# Patient Record
Sex: FEMALE | Race: WHITE | Employment: OTHER | URBAN - METROPOLITAN AREA
[De-identification: names, ages, dates, MRNs, and addresses within clinical notes are randomized per-mention and may not be internally consistent; named-entity substitution may affect disease eponyms.]

---

## 2017-01-19 NOTE — PATIENT DISCUSSION
(G32.812) Other secondary cataract, bilateral - Assesment : Posterior capsule opacification present. OS>OD. Pt is bothered and symptomatic OS. - Plan : Recommended Yag Cap OS to improve visual symptoms OS. R/B/A's discussed, including risk of RD. All questions answered. Pt wishes to proceed. Schedule Yag Cap OS.

## 2017-01-19 NOTE — PATIENT DISCUSSION
(Q48.9860) Nexdtve age-related mclr degn bilateral intermed dry stage - Assesment : Examination revealed AMD Dry. Mac OCT today. - Plan : Monitor for changes. Continue AREDS supplements and monitoring Amsler Grid at home. Call with any vision or Amsler changes. RTC in 6 months for IOP Check and OCT Mac, sooner if problems or changes noticed.

## 2017-01-30 ENCOUNTER — HOSPITAL ENCOUNTER (OUTPATIENT)
Dept: MAMMOGRAPHY | Age: 62
Discharge: HOME | End: 2017-01-30
Attending: INTERNAL MEDICINE
Payer: COMMERCIAL

## 2017-01-30 DIAGNOSIS — Z12.31 VISIT FOR SCREENING MAMMOGRAM: ICD-10-CM

## 2017-01-30 PROCEDURE — 77063 BREAST TOMOSYNTHESIS BI: CPT

## 2017-03-20 NOTE — PATIENT DISCUSSION
(H35.342) Macular cyst, hole, or pseudohole, left eye - Assesment : Examination revealed a macular cyst - enlarging on OCT.  - Plan : Refer to retina specialist.

## 2017-03-20 NOTE — PATIENT DISCUSSION
(F10.692) Other secondary cataract, left eye - Assesment : s/p Yag OS. Doing well. - Plan : Monitor for Changes. RTC in July as scheduled.

## 2017-06-12 ENCOUNTER — OFFICE VISIT (OUTPATIENT)
Dept: VASCULAR SURGERY | Age: 62
End: 2017-06-12

## 2017-06-12 DIAGNOSIS — M79.604 PAIN OF RIGHT LOWER EXTREMITY: Primary | ICD-10-CM

## 2017-06-12 NOTE — PROCEDURES
[facility]   *** FINAL REPORT ***    Name: Salomon Dance  MRN: [id number]       Outpatient  : 29 Dec 1955  HIS Order #: [id number]  [address] [facility] Visit #: [id number]  Date: 2017    TYPE OF TEST: Peripheral Venous Testing    REASON FOR TEST  Pain in limb    Right Leg:-  Deep venous thrombosis:           No  Superficial venous thrombosis:    Not examined  Deep venous insufficiency:        Not examined  Superficial venous insufficiency: Not examined      INTERPRETATION/FINDINGS  Duplex images were obtained using 2-D gray scale, color flow and  spectral doppler analysis. Right leg :  1. No evidence of deep venous thrombosis detected in the common  femoral, femoral, profunda, popliteal, posterior tibial and peroneal  veins visualized. 2. No evidence of superficial thrombosis in the area of pain in the  calf. 3. Hyperechoic area visualized in the medial calf with palpable  tenderness. 4. Patent contralateral common femoral vein without evidence of  thrombus. 5. Multphasic tibial doppler signals at rest on the right. ADDITIONAL COMMENTS  Bianca Rivera given verbal report for Dr. Nick Damon @ 1:22pm.    I have personally reviewed the data relevant to the interpretation of  this  study. TECHNOLOGIST: Naren Gaspar RDMS  Signed: 2017 01:22 PM    PHYSICIAN: Nathaniel Escudero.  Francy Gaming MD  Signed: 2017 02:27 PM

## 2017-07-10 ENCOUNTER — OFFICE VISIT (OUTPATIENT)
Dept: CARDIOLOGY CLINIC | Age: 62
End: 2017-07-10

## 2017-07-10 VITALS
SYSTOLIC BLOOD PRESSURE: 134 MMHG | HEIGHT: 66 IN | HEART RATE: 79 BPM | WEIGHT: 171 LBS | BODY MASS INDEX: 27.48 KG/M2 | DIASTOLIC BLOOD PRESSURE: 80 MMHG

## 2017-07-10 DIAGNOSIS — R06.02 SOB (SHORTNESS OF BREATH) ON EXERTION: ICD-10-CM

## 2017-07-10 DIAGNOSIS — I10 ESSENTIAL HYPERTENSION: Primary | ICD-10-CM

## 2017-07-10 DIAGNOSIS — R07.89 CHEST PRESSURE: ICD-10-CM

## 2017-07-10 DIAGNOSIS — R42 DIZZINESS: ICD-10-CM

## 2017-07-10 RX ORDER — RANITIDINE 150 MG/1
150 TABLET, FILM COATED ORAL 2 TIMES DAILY
COMMUNITY
End: 2018-02-21

## 2017-07-10 RX ORDER — CALCIUM POLYCARBOPHIL 625 MG
625 TABLET ORAL DAILY
COMMUNITY
End: 2019-03-08

## 2017-07-10 RX ORDER — CALCIUM CARBONATE 600 MG
600 TABLET ORAL 2 TIMES DAILY
COMMUNITY
End: 2020-03-06

## 2017-07-10 RX ORDER — HYDROCHLOROTHIAZIDE 12.5 MG/1
12.5 TABLET ORAL DAILY
COMMUNITY
End: 2017-07-14

## 2017-07-10 RX ORDER — FLUTICASONE PROPIONATE 50 MCG
2 SPRAY, SUSPENSION (ML) NASAL DAILY
COMMUNITY
End: 2018-02-21

## 2017-07-10 RX ORDER — RIZATRIPTAN BENZOATE 5 MG/1
5 TABLET ORAL
COMMUNITY

## 2017-07-10 RX ORDER — ESTRADIOL 10 UG/1
10 INSERT VAGINAL AS NEEDED
COMMUNITY
End: 2021-04-15

## 2017-07-10 NOTE — PROGRESS NOTES
HISTORY OF PRESENT ILLNESS  Eliazar Tabor is a 64 y.o. female. HPI Comments: Patient is here for follow up of diagnostic tests. Results will be discussed. Chest Pain (Angina)    The history is provided by the patient. This is a new problem. The current episode started more than 1 week ago. The problem has not changed since onset. The problem occurs every several days. The pain is associated with exertion. The pain is present in the substernal region. The pain is mild. The quality of the pain is described as tightness. The pain does not radiate. Dizziness   The history is provided by the patient. This is a new problem. The problem occurs every several days. The problem has not changed since onset. The symptoms are aggravated by exertion. Nothing relieves the symptoms. Shortness of Breath   The history is provided by the patient. This is a recurrent problem. The problem occurs intermittently. The problem has not changed since onset. Pertinent negatives include no wheezing, no rash and no leg swelling. The problem's precipitants include exercise. Review of Systems   Constitutional: Negative for chills. HENT: Negative for nosebleeds. Eyes: Negative for blurred vision and double vision. Respiratory: Negative for wheezing. Cardiovascular: Negative for leg swelling. Gastrointestinal: Negative for heartburn. Musculoskeletal: Negative for myalgias. Skin: Negative for rash. Endo/Heme/Allergies: Does not bruise/bleed easily.      Family History   Problem Relation Age of Onset    Heart Disease Father      a fib ablation/cva    Heart Attack Father     Stroke Father     Cancer Maternal Grandfather 61     colon       Past Medical History:   Diagnosis Date    Essential hypertension     Valvular heart disease     mvp       Past Surgical History:   Procedure Laterality Date    HX CATARACT REMOVAL      HX HYSTERECTOMY  2004    HX OOPHORECTOMY  2004    HX SHOULDER ARTHROSCOPY         Allergies Allergen Reactions    Epinephrine Shortness of Breath       Current Outpatient Prescriptions   Medication Sig    hydroCHLOROthiazide (HYDRODIURIL) 12.5 mg tablet Take 12.5 mg by mouth daily.  calcium carbonate (CALTREX) 600 mg calcium (1,500 mg) tablet Take 600 mg by mouth two (2) times a day.  fluticasone (FLONASE) 50 mcg/actuation nasal spray 2 Sprays by Both Nostrils route daily.  rizatriptan (MAXALT) 5 mg tablet Take 5 mg by mouth once as needed for Migraine. May repeat in 2 hours if needed    raNITIdine (ZANTAC) 150 mg tablet Take 150 mg by mouth two (2) times a day.  calcium polycarbophil (FIBERCON) 625 mg tablet Take 625 mg by mouth daily.  estradiol (VAGIFEM) 10 mcg tab vaginal tablet Insert 10 mcg into vagina daily.  aspirin 81 mg tablet Take 81 mg by mouth daily.  multivitamin (ONE A DAY) tablet Take 1 Tab by mouth daily.  magnesium 250 mg Tab Take  by mouth daily.  calcium 100 mg cap Take  by mouth daily.  omeprazole (PRILOSEC) 40 mg capsule Take 40 mg by mouth daily.  fexofenadine (ALLEGRA) 180 mg tablet Take  by mouth daily.  synthetic conj estrogens a 0.3 mg tablet Take 0.3 mg by mouth daily. No current facility-administered medications for this visit. Visit Vitals    /80    Pulse 79    Ht 5' 6\" (1.676 m)    Wt 77.6 kg (171 lb)    BMI 27.6 kg/m2           Physical Exam   Constitutional: She is oriented to person, place, and time. She appears well-developed and well-nourished. HENT:   Head: Normocephalic and atraumatic. Eyes: Conjunctivae are normal.   Neck: Neck supple. No JVD present. No tracheal deviation present. No thyromegaly present. Cardiovascular: Normal rate and regular rhythm. PMI is not displaced. Exam reveals no gallop, no S3 and no decreased pulses. No murmur heard. Pulmonary/Chest: No respiratory distress. She has no wheezes. She has no rales. She exhibits no tenderness. Abdominal: Soft. There is no tenderness. Musculoskeletal: She exhibits no edema. Neurological: She is alert and oriented to person, place, and time. Skin: Skin is warm. Psychiatric: She has a normal mood and affect. CARDIOLOGY STUDIES 7/10/2013   Echocardiogram - Complete Result normal ef,no doppler abnormality   Some recent data might be hidden         Assessment       ICD-10-CM ICD-9-CM    1. Essential hypertension I10 401.9     uses prn hctz   2. Dizziness R42 780.4 TILT TABLE EVAL W/WO DRUG    episodic  ? etiology   occasional low bp in 90's   3. Chest pressure R07.89 786.59 2D ECHO COMPLETE ADULT (TTE)      AMB POC STRESS ECHOCARDIOGRAM      ECHO TTE STRESS EXERCISE TREADMILL COMP      ECHO TTE STRESS EXRCSE COMP W OR WO CONTR      TILT TABLE EVAL W/WO DRUG    ? angina  recent ekg reposted normal at pcp   4. SOB (shortness of breath) on exertion R06.02 786.05 2D ECHO COMPLETE ADULT (TTE)      AMB POC STRESS ECHOCARDIOGRAM      ECHO TTE STRESS EXERCISE TREADMILL COMP      ECHO TTE STRESS EXRCSE COMP W OR WO CONTR      TILT TABLE EVAL W/WO DRUG    recently worse  ? angina,hcvd,less likely pulm etiology       There are no discontinued medications.     Orders Placed This Encounter    2D ECHO COMPLETE ADULT (TTE)     Standing Status:   Future     Standing Expiration Date:   1/6/2018     Order Specific Question:   Reason for Exam:     Answer:   see diagnosis    AMB POC STRESS ECHOCARDIOGRAM     Standing Status:   Future     Standing Expiration Date:   1/7/2018     Order Specific Question:   Reason for Exam:     Answer:   See diagnosis    TILT TABLE EVAL W/WO DRUG     Standing Status:   Future     Standing Expiration Date:   1/10/2018     Order Specific Question:   Reason for Exam:     Answer:   dizziness    ECHO TTE STRESS EXERCISE TREADMILL COMP     Standing Status:   Future     Standing Expiration Date:   1/10/2018     Order Specific Question:   Reason for Exam:     Answer:   htn    ECHO TTE STRESS EXRCSE COMP W OR WO CONTR Standing Status:   Future     Standing Expiration Date:   1/10/2018     Order Specific Question:   Reason for Exam:     Answer:   htn     Order Specific Question:   Contrast Enhancement (Bubble Study, Definity, Optison) may be used if criteria listed in established evidence-based protocol has been identified. Answer:   Yes       Follow-up Disposition:  Return for F/u after tests.

## 2017-07-10 NOTE — MR AVS SNAPSHOT
Visit Information Date & Time Provider Department Dept. Phone Encounter #  
 7/10/2017 12:45 PM Mindy High MD [facility] [address] [place] [address] [id number] Follow-up Instructions Return for F/u after tests. Your Appointments 7/28/2017  8:30 AM  
PROCEDURE with [facility] E[facility]) Appt Note: echo [place] [email or web address], [address] [place] [address]  
[address], [address] [place] 8/16/2017  1:15 PM  
Office Visit with Mindy High MD  
[facility]) Appt Note: post echo stress echo and tilt [email or web address], [address] [place] [address]  
[address], [address] [place] Upcoming Health Maintenance Date Due Hepatitis C Screening 1955 DTaP/Tdap/Td series (1 - Tdap) 12/29/1976 PAP AKA CERVICAL CYTOLOGY 12/29/1976 FOBT Q 1 YEAR AGE 50-75 12/29/2005 ZOSTER VACCINE AGE 60> 12/29/2015 INFLUENZA AGE 9 TO ADULT 8/1/2017 BREAST CANCER SCRN MAMMOGRAM 1/30/2019 Allergies as of 7/10/2017  Review Complete On: 7/10/2017 By: Mindy High MD  
  
 Severity Noted Reaction Type Reactions Epinephrine Medium 06/07/2013   Not Verified Shortness of Breath Current Immunizations  Never Reviewed No immunizations on file. Not reviewed this visit You Were Diagnosed With   
  
 Codes Comments Essential hypertension    -  Primary ICD-10-CM: I10 
ICD-9-CM: 401.9 uses prn hctz Dizziness     ICD-10-CM: Q87 ICD-9-CM: 780.4 episodic 
? etiology  
occasional low bp in 90's Chest pressure     ICD-10-CM: R07.89 ICD-9-CM: 786.59 ? angina 
recent ekg reposted normal at pcp SOB (shortness of breath) on exertion     ICD-10-CM: R06.02 
ICD-9-CM: 786.05 recently worse 
? angina,hcvd,less likely pulm etiology Vitals BP Pulse Height(growth percentile) Weight(growth percentile) BMI Smoking Status 134/80 79 5' 6\" (1.676 m) 171 lb (77.6 kg) 27.6 kg/m2 Never Smoker Vitals History BMI and BSA Data Body Mass Index Body Surface Area  
 27.6 kg/m 2 1.9 m 2 Preferred Pharmacy [facility] [address] [facility] [address] [place] [address] [address] [facility] [phone] Your Updated Medication List  
  
   
This list is accurate as of: 7/10/17 12:49 PM.  Always use your most recent med list.  
  
  
  
  
 aspirin 81 mg tablet Take 81 mg by mouth daily. calcium 100 mg Cap Take  by mouth daily. calcium carbonate 600 mg calcium (1,500 mg) tablet Commonly known as:  Benito Mires Take 600 mg by mouth two (2) times a day. calcium polycarbophil 625 mg tablet Commonly known as:  Ermalinda Millin Take 625 mg by mouth daily. fexofenadine 180 mg tablet Commonly known as:  Kenith Fitting Take  by mouth daily. FLONASE 50 mcg/actuation nasal spray Generic drug:  fluticasone 2 Sprays by Both Nostrils route daily. hydroCHLOROthiazide 12.5 mg tablet Commonly known as:  HYDRODIURIL Take 12.5 mg by mouth daily. magnesium 250 mg Tab Take  by mouth daily. MAXALT 5 mg tablet Generic drug:  rizatriptan Take 5 mg by mouth once as needed for Migraine. May repeat in 2 hours if needed  
  
 multivitamin tablet Commonly known as:  ONE A DAY Take 1 Tab by mouth daily. omeprazole 40 mg capsule Commonly known as:  PRILOSEC Take 40 mg by mouth daily. synthetic conj estrogens a 0.3 mg tablet Take 0.3 mg by mouth daily. VAGIFEM 10 mcg Tab vaginal tablet Generic drug:  estradiol Insert 10 mcg into vagina daily. ZANTAC 150 mg tablet Generic drug:  raNITIdine Take 150 mg by mouth two (2) times a day. Follow-up Instructions Return for F/u after tests. To-Do List   
 07/10/2017 ECG:  TILT TABLE EVAL W/WO DRUG   
  
 07/11/2017 ECHO:  ECHO TTE STRESS EXERCISE TREADMILL COMP   
  
 07/11/2017 ECHO:  ECHO TTE STRESS EXRCSE COMP W OR WO CONTR   
  
 07/13/2017 [facility]:  2D ECHO COMPLETE ADULT (TTE) Around 07/17/2017 ECG:  AMB POC STRESS ECHOCARDIOGRAM   
  
  
Introducing [employer]! Dear Tirso Hooker: Thank you for requesting a anydooR account. Our records indicate that you already have an active anydooR account. You can access your account anytime at [email or web address]. [email or web address] Did you know that you can access your hospital and ER discharge instructions at any time in anydooR? You can also review all of your test results from your hospital stay or ER visit. Additional Information If you have questions, please visit the Frequently Asked Questions section of the anydooR website at [email or web address] Remember, anydooR is NOT to be used for urgent needs. For medical emergencies, dial 911. Now available from your iPhone and Android! Please provide this summary of care documentation to your next provider. Your primary care clinician is listed as Bhupinder Barajas. If you have any questions after today's visit, please call [phone].

## 2017-07-10 NOTE — PROGRESS NOTES
1. Have you been to the ER, urgent care clinic since your last visit? Hospitalized since your last visit? No    2. Have you seen or consulted any other health care providers outside of the [address] [place] [address] since your last visit? Include any pap smears or colon screening. Yes Where: PCP     3. Since your last visit, have you had any of the following symptoms? chest pains, shortness of breath and dizziness. 4.  Have you had any blood work, X-rays or cardiac testing? Yes Where: PCP/EKG             5.  Where do you normally have your labs drawn? PCP Office    6. Do you need any refills today?    No

## 2017-07-14 ENCOUNTER — HOSPITAL ENCOUNTER (OUTPATIENT)
Dept: CARDIAC CATH/INVASIVE PROCEDURES | Age: 62
Discharge: HOME | End: 2017-07-14
Attending: INTERNAL MEDICINE | Admitting: INTERNAL MEDICINE
Payer: COMMERCIAL

## 2017-07-14 VITALS
OXYGEN SATURATION: 99 % | BODY MASS INDEX: 26.84 KG/M2 | HEART RATE: 72 BPM | WEIGHT: 167 LBS | SYSTOLIC BLOOD PRESSURE: 129 MMHG | RESPIRATION RATE: 31 BRPM | HEIGHT: 66 IN | DIASTOLIC BLOOD PRESSURE: 71 MMHG

## 2017-07-14 PROCEDURE — 93660 TILT TABLE EVALUATION: CPT

## 2017-07-14 PROCEDURE — 74011250636 HC RX REV CODE- 250/636: Performed by: INTERNAL MEDICINE

## 2017-07-14 RX ORDER — SODIUM CHLORIDE 9 MG/ML
500 INJECTION, SOLUTION INTRAVENOUS ONCE
Status: COMPLETED | OUTPATIENT
Start: 2017-07-14 | End: 2017-07-14

## 2017-07-14 RX ORDER — METOPROLOL SUCCINATE 50 MG/1
50 TABLET, EXTENDED RELEASE ORAL DAILY
Qty: 30 TAB | Refills: 6 | Status: SHIPPED | OUTPATIENT
Start: 2017-07-14 | End: 2017-07-26 | Stop reason: SDUPTHER

## 2017-07-14 RX ADMIN — SODIUM CHLORIDE 500 ML: 900 INJECTION, SOLUTION INTRAVENOUS at 12:07

## 2017-07-14 NOTE — PROGRESS NOTES
Date of Surgery Update:  Shanti Winn was seen and examined. History and physical has been reviewed. The patient has been examined.  There have been no significant clinical changes since the completion of the originally dated History and Physical.    Signed By: José Miguel Dawn MD     July 14, 2017 12:59 PM

## 2017-07-14 NOTE — PROGRESS NOTES
Pt ambulatory to treatment area, gowned; Dr. Ajay Slaughter contacted [phone] and requested PA Ampi consent pt; [phone] consent completed;

## 2017-07-14 NOTE — IP AVS SNAPSHOT
Yeseniadenver Pippa 
 
 
 920 [facility] [address] [place] [address] Rd Patient: Mary Jones MRN: [id number] :1955 You are allergic to the following Allergen Reactions Epinephrine Shortness of Breath Recent Documentation Height Weight Breastfeeding? BMI Smoking Status 1.676 m 75.8 kg No 26.95 kg/m2 Never Smoker Emergency Contacts Name Discharge Info Relation Home Work Mobile [address] [facility] CAREGIVER [3] Spouse [3] [phone] 53 [facility]  Parent [1] [phone] About your hospitalization You were admitted on:  2017 You last received care in the:  [facility] 1 CATH HOLDING You were discharged on:  2017 Unit phone number:  [phone] Why you were hospitalized Your primary diagnosis was:  Not on File Providers Seen During Your Hospitalizations Provider Role Specialty Primary office phone Franky Gray MD Attending Provider Cardiology [phone] Your Primary Care Physician (PCP) Primary Care Physician Office Phone Office Fax 1368 [address] [phone] Follow-up Information Follow up With Details Comments Contact Info Paulina Fernando MD Schedule an appointment as soon as possible for a visit As needed [address] [address] 
[phone] Franky Gray MD Go to as scheduled [address] [facility] [address] [facility] [address] [phone] Your Appointments 2017 11:00 AM EDT  
STRESS ECHO with [facility] STRESS LAB, [facility] ECH LAB RM 1 [facility] NON-INVASIVE CARD 1000 [facility] [address], 29 [facility] [address] [phone] Age Limit for ALL Heart procedures @ [facility] facilities: 18 yrs and older only. Under the age of 25 suggest referring to [facility] ([phone]). PATIENTS SHOULD NOT BRING CHILDREN UNATTENDED TO APPTS. Weight Limit:  350 lbs  1-No eating or coffee after midnight 2-Do not take Beta Blocker or Calcium Channel blocker the day of the test unless specified by cardiologist. Please bring with. 3-Do not take diabetic meds day of test (bring with) 4-Patient will be walking/running on a Treadmill. Patient should wear comfortable shoes that are suitable for walking (NO Sandals/Flip Flops, High Heels, etc.) & comfortable clothing. APPOINTMENTS SCHEDULED BEFORE 3:00PM  Please arrive in the [address] [place] [address] and check in with the registrar 15 minutes prior to your scheduled appointment time. This is the same entrance as the [facility], facing [employer]. Heart Center Parking: turn off [employer] onto [address]. Proceed one block and make a right onto [employer] [place] will be on your left). Go to the end of [employer] and parking is located on your left. APPOINTMENTS SCHEDULED AT 3:00PM OR LATER:  Registration in the [address] [place] [address] CLOSES at 3:00 p.m. Patients should report to Patient Access/Admitting located on the left after entering the MAIN entrance of [facility]. Patient should plan to arrive 30 minutes prior to their appointment time if going to Patient Access/Admitting. After test, patient may exit from the [address] [place] [address] or [facility] Entrance. Friday July 28, 2017  8:30 AM EDT PROCEDURE with [facility] [place] ([address]) [address] [facility], [facility] 102 [facility] [address]  
[phone] Wednesday August 16, 2017  1:15 PM EDT Office Visit with Mindy High MD  
[facility]) [address] [facility], [address] [facility] [address] [phone] Current Discharge Medication List  
  
START taking these medications Dose & Instructions Dispensing Information Comments Morning Noon Evening Bedtime  
 metoprolol succinate 50 mg XL tablet Commonly known as:  TOPROL-XL Your last dose was: Your next dose is:    
   
   
 Dose:  50 mg Take 1 Tab by mouth daily. Quantity:  30 Tab Refills:  6 CONTINUE these medications which have NOT CHANGED Dose & Instructions Dispensing Information Comments Morning Noon Evening Bedtime  
 aspirin 81 mg tablet Your last dose was: Your next dose is:    
   
   
 Dose:  81 mg Take 81 mg by mouth daily. Refills:  0  
     
   
   
   
  
 calcium 100 mg Cap Your last dose was: Your next dose is: Take  by mouth daily. Refills:  0  
     
   
   
   
  
 calcium carbonate 600 mg calcium (1,500 mg) tablet Commonly known as:  Naren Kim Your last dose was: Your next dose is:    
   
   
 Dose:  600 mg Take 600 mg by mouth two (2) times a day. Refills:  0  
     
   
   
   
  
 calcium polycarbophil 625 mg tablet Commonly known as:  Hermila Hernandez Your last dose was: Your next dose is:    
   
   
 Dose:  625 mg Take 625 mg by mouth daily. Refills:  0  
     
   
   
   
  
 fexofenadine 180 mg tablet Commonly known as:  Tobe Cowden Your last dose was: Your next dose is: Take  by mouth daily. Refills:  0  
     
   
   
   
  
 FLONASE 50 mcg/actuation nasal spray Generic drug:  fluticasone Your last dose was: Your next dose is:    
   
   
 Dose:  2 Spray 2 Sprays by Both Nostrils route daily. Refills:  0  
     
   
   
   
  
 magnesium 250 mg Tab Your last dose was: Your next dose is: Take  by mouth daily. Refills:  0 MAXALT 5 mg tablet Generic drug:  rizatriptan Your last dose was: Your next dose is:    
   
   
 Dose:  5 mg Take 5 mg by mouth once as needed for Migraine.  May repeat in 2 hours if needed Refills:  0  
     
   
   
   
  
 multivitamin tablet Commonly known as:  ONE A DAY Your last dose was: Your next dose is:    
   
   
 Dose:  1 Tab Take 1 Tab by mouth daily. Refills:  0  
     
   
   
   
  
 omeprazole 40 mg capsule Commonly known as:  PRILOSEC Your last dose was: Your next dose is:    
   
   
 Dose:  40 mg Take 40 mg by mouth daily. Refills:  0  
     
   
   
   
  
 synthetic conj estrogens a 0.3 mg tablet Your last dose was: Your next dose is:    
   
   
 Dose:  0.3 mg Take 0.3 mg by mouth daily. Refills:  0 VAGIFEM 10 mcg Tab vaginal tablet Generic drug:  estradiol Your last dose was: Your next dose is:    
   
   
 Dose:  10 mcg Insert 10 mcg into vagina daily. Refills:  0  
     
   
   
   
  
 ZANTAC 150 mg tablet Generic drug:  raNITIdine Your last dose was: Your next dose is:    
   
   
 Dose:  150 mg Take 150 mg by mouth two (2) times a day. Refills:  0 STOP taking these medications   
 hydroCHLOROthiazide 12.5 mg tablet Commonly known as:  HYDRODIURIL Where to Get Your Medications These medications were sent to [facility] [address] [place] [address] 32 AT [address] Hours:  24-hours Phone:  [phone]  
  metoprolol succinate 50 mg XL tablet Discharge Instructions Tilt Table Test: About This Test 
What is it? A tilt table test is used to check people who have fainted or who often feel lightheaded. The results help your doctor know the cause of your fainting or feeling lightheaded. The test uses a special table that slowly tilts you to an upright position. It checks how your body responds when you change positions. Why is this test done? This test checks what causes your symptoms by monitoring them while changing your position. Your doctor can see if you faint or feel lightheaded because of problems with your heart rate or blood pressure. When people move from a lying position to an upright one, their blood pressure normally drops. But the body adjusts to this. Your nervous system senses changes in body position and controls your heart rate and blood pressure. If the nervous system doesn't work properly, you might feel lightheaded or faint. This can happen if your blood pressure stays too low. Your heart rate also may slow down or speed up. You feel lightheaded because your brain is not getting a normal amount of blood for a short time. This problem is called syncope (say \"GRMO-miq-oei\"). Syncope might happen during the test when you change to an upright position. How can you prepare for the test? 
· Tell your doctor about any medicines you take. Ask your doctor if you need to stop taking any medicines before the test. 
· You may be asked to not eat or drink for a few hours before the test. 
What happens during the test? 
· The test is usually done in a hospital or a cardiologist's office. · You will have small patches or pads attached to your skin. These are sensors that monitor your heart. You will also have a blood pressure cuff on your arm. And you may have an IV. · During the test, you will lie flat on a table that can tilt you up to almost a standing position. You will be strapped securely to the table. · Your heart rate and blood pressure are checked regularly as the table is tilted up. · You will be asked if you feel any symptoms like nausea, sweating, dizziness, or an abnormal heartbeat. If you don't have any symptoms, you may be given medicine to speed up your heart rate. Then you will be checked for symptoms again.  
· If you faint during the test, the table will be returned to a flat position. You will be checked closely and taken care of right away by your medical team. Most people wake up right away. What else should you know about the test? 
· The test result is normal if your blood pressure stays stable during the test and you do not feel lightheaded or faint. The test result is not normal if your blood pressure drops and you feel lightheaded or faint. These symptoms might happen because of a slow heart rate. How long does the test take? · The test will take about an hour. It may take longer if you get medicine to speed up your heart during the test. 
What happens after the test? 
· Your heart rate and blood pressure will be checked before you go home. · You may need to have someone drive you home after the test. 
· You can probably go back to your usual activities right away. But some people feel a little tired or nauseated Follow-up care is a key part of your treatment and safety. Be sure to make and go to all appointments, and call your doctor if you are having problems. It's also a good idea to keep a list of the medicines you take. Ask your doctor when you can expect to have your test results. Where can you learn more? Go to http://gemma-bennett.info/. Enter X961 in the search box to learn more about \"Tilt Table Test: About This Test.\" Current as of: September 21, 2016 Content Version: 11.3 © 5615-4461 [employer]. Care instructions adapted under license by [employer] (which disclaims liability or warranty for this information). If you have questions about a medical condition or this instruction, always ask your healthcare professional. [place] [address] any warranty or liability for your use of this information. DISCHARGE SUMMARY from Nurse The following personal items are in your possession at time of discharge: 
 
  
Visual Aid: Glasses, With patient PATIENT INSTRUCTIONS: 
 
 After general anesthesia or intravenous sedation, for 24 hours or while taking prescription Narcotics: · Limit your activities · Do not drive and operate hazardous machinery · Do not make important personal or business decisions · Do  not drink alcoholic beverages · If you have not urinated within 8 hours after discharge, please contact your surgeon on call. Report the following to your surgeon: 
· Excessive pain, swelling, redness or odor of or around the surgical area · Temperature over 100.5 · Nausea and vomiting lasting longer than 4 hours or if unable to take medications · Any signs of decreased circulation or nerve impairment to extremity: change in color, persistent  numbness, tingling, coldness or increase pain · Any questions What to do at Home: *  Please give a list of your current medications to your Primary Care Provider. *  Please update this list whenever your medications are discontinued, doses are 
    changed, or new medications (including over-the-counter products) are added. *  Please carry medication information at all times in case of emergency situations. These are general instructions for a healthy lifestyle: No smoking/ No tobacco products/ Avoid exposure to second hand smoke Surgeon General's Warning:  Quitting smoking now greatly reduces serious risk to your health. Obesity, smoking, and sedentary lifestyle greatly increases your risk for illness A healthy diet, regular physical exercise & weight monitoring are important for maintaining a healthy lifestyle You may be retaining fluid if you have a history of heart failure or if you experience any of the following symptoms:  Weight gain of 3 pounds or more overnight or 5 pounds in a week, increased swelling in our hands or feet or shortness of breath while lying flat in bed. Please call your doctor as soon as you notice any of these symptoms; do not wait until your next office visit. Recognize signs and symptoms of STROKE: 
 
F-face looks uneven A-arms unable to move or move unevenly S-speech slurred or non-existent T-time-call 911 as soon as signs and symptoms begin-DO NOT go Back to bed or wait to see if you get better-TIME IS BRAIN. Warning Signs of HEART ATTACK Call 911 if you have these symptoms: 
? Chest discomfort. Most heart attacks involve discomfort in the center of the chest that lasts more than a few minutes, or that goes away and comes back. It can feel like uncomfortable pressure, squeezing, fullness, or pain. ? Discomfort in other areas of the upper body. Symptoms can include pain or discomfort in one or both arms, the back, neck, jaw, or stomach. ? Shortness of breath with or without chest discomfort. ? Other signs may include breaking out in a cold sweat, nausea, or lightheadedness. Don't wait more than five minutes to call [address]! Fast action can save your life. Calling 911 is almost always the fastest way to get lifesaving treatment. Emergency Medical Services staff can begin treatment when they arrive  up to an hour sooner than if someone gets to the hospital by car. The discharge information has been reviewed with the patient and spouse. The patient and spouse verbalized understanding. Discharge medications reviewed with the patient and spouse and appropriate educational materials and side effects teaching were provided. Patient armband removed and shredded MyChart Activation Thank you for requesting access to [employer]. Please follow the instructions below to securely access and download your online medical record. [employer]ify allows you to send messages to your doctor, view your test results, renew your prescriptions, schedule appointments, and more. How Do I Sign Up? 1. In your internet browser, go to [email or web address] 
2. Click on the First Time User? Click Here link in the Sign In box.  You will be redirect to the New Member Sign Up page. 3. Enter your [employer] Access Code exactly as it appears below. You will not need to use this code after youve completed the sign-up process. If you do not sign up before the expiration date, you must request a new code. MyChart Access Code: Activation code not generated Current [employer] Status: Active (This is the date your MiniLuxet access code will ) 4. Enter the last four digits of your Social Security Number (xxxx) and Date of Birth (mm/dd/yyyy) as indicated and click Submit. You will be taken to the next sign-up page. 5. Create a [employer] ID. This will be your [employer] login ID and cannot be changed, so think of one that is secure and easy to remember. 6. Create a [employer] password. You can change your password at any time. 7. Enter your Password Reset Question and Answer. This can be used at a later time if you forget your password. 8. Enter your e-mail address. You will receive e-mail notification when new information is available in [address]. 9. Click Sign Up. You can now view and download portions of your medical record. 10. Click the Download Summary menu link to download a portable copy of your medical information. Additional Information If you have questions, please visit the Frequently Asked Questions section of the [employer] website at [email or web address] Remember, [employer] is NOT to be used for urgent needs. For medical emergencies, dial 911. Discharge Orders None Introducing [facility]! Dear Hermelinda Silverio: Thank you for requesting a [employer] account. Our records indicate that you already have an active [employer] account. You can access your account anytime at [email or web address] Did you know that you can access your hospital and ER discharge instructions at any time in [employer]? You can also review all of your test results from your hospital stay or ER visit. Additional Information If you have questions, please visit the Frequently Asked Questions section of the MyChart website at [email or web address] Remember, MyChart is NOT to be used for urgent needs. For medical emergencies, dial 911. Now available from your iPhone and Android! General Information Please provide this summary of care documentation to your next provider. Patient Signature:  ____________________________________________________________ Date:  ____________________________________________________________  
  
Sissy Lapine Provider Signature:  ____________________________________________________________ Date:  ____________________________________________________________

## 2017-07-14 NOTE — PROGRESS NOTES
Patient armband removed and shreddedI have reviewed discharge instructions with the patient. The patient and spouse verbalized understanding.  Pt dc'd to home at this time; spouse present; pt ambulatory to [facility] without difficulty, spouse driving

## 2017-07-14 NOTE — PROCEDURES
Tilt table performed  Patient was in upright position for about 5 min after hydration  Her bp quickly dropped from 462'Z systolic to 64'V systolic,patient was cold clammy -put in supine position with resolution of symptoms  No significant change in heart rate  Imp  Positive tilt table test  Vasovagal-vaso depressive responce

## 2017-07-14 NOTE — DISCHARGE INSTRUCTIONS
Tilt Table Test: About This Test  What is it? A tilt table test is used to check people who have fainted or who often feel lightheaded. The results help your doctor know the cause of your fainting or feeling lightheaded. The test uses a special table that slowly tilts you to an upright position. It checks how your body responds when you change positions. Why is this test done? This test checks what causes your symptoms by monitoring them while changing your position. Your doctor can see if you faint or feel lightheaded because of problems with your heart rate or blood pressure. When people move from a lying position to an upright one, their blood pressure normally drops. But the body adjusts to this. Your nervous system senses changes in body position and controls your heart rate and blood pressure. If the nervous system doesn't work properly, you might feel lightheaded or faint. This can happen if your blood pressure stays too low. Your heart rate also may slow down or speed up. You feel lightheaded because your brain is not getting a normal amount of blood for a short time. This problem is called syncope (say \"ONNJ-[id number]-eip\"). Syncope might happen during the test when you change to an upright position. How can you prepare for the test?  · Tell your doctor about any medicines you take. Ask your doctor if you need to stop taking any medicines before the test.  · You may be asked to not eat or drink for a few hours before the test.  What happens during the test?  · The test is usually done in a hospital or a cardiologist's office. · You will have small patches or pads attached to your skin. These are sensors that monitor your heart. You will also have a blood pressure cuff on your arm. And you may have an IV. · During the test, you will lie flat on a table that can tilt you up to almost a standing position. You will be strapped securely to the table.   · Your heart rate and blood pressure are checked regularly as the table is tilted up. · You will be asked if you feel any symptoms like nausea, sweating, dizziness, or an abnormal heartbeat. If you don't have any symptoms, you may be given medicine to speed up your heart rate. Then you will be checked for symptoms again. · If you faint during the test, the table will be returned to a flat position. You will be checked closely and taken care of right away by your medical team. Most people wake up right away. What else should you know about the test?  · The test result is normal if your blood pressure stays stable during the test and you do not feel lightheaded or faint. The test result is not normal if your blood pressure drops and you feel lightheaded or faint. These symptoms might happen because of a slow heart rate. How long does the test take? · The test will take about an hour. It may take longer if you get medicine to speed up your heart during the test.  What happens after the test?  · Your heart rate and blood pressure will be checked before you go home. · You may need to have someone drive you home after the test.  · You can probably go back to your usual activities right away. But some people feel a little tired or nauseated  Follow-up care is a key part of your treatment and safety. Be sure to make and go to all appointments, and call your doctor if you are having problems. It's also a good idea to keep a list of the medicines you take. Ask your doctor when you can expect to have your test results. Where can you learn more? Go to http://gemma-bennett.info/. Enter Y702 in the search box to learn more about \"Tilt Table Test: About This Test.\"  Current as of: September 21, 2016  Content Version: 11.3  © 1405-5163 [employer]. Care instructions adapted under license by [employer] (which disclaims liability or warranty for this information).  If you have questions about a medical condition or this instruction, always ask your healthcare professional. [place] [address] any warranty or liability for your use of this information. DISCHARGE SUMMARY from Nurse    The following personal items are in your possession at time of discharge:       Visual Aid: Glasses, With patient                            PATIENT INSTRUCTIONS:    After general anesthesia or intravenous sedation, for 24 hours or while taking prescription Narcotics:  · Limit your activities  · Do not drive and operate hazardous machinery  · Do not make important personal or business decisions  · Do  not drink alcoholic beverages  · If you have not urinated within 8 hours after discharge, please contact your surgeon on call. Report the following to your surgeon:  · Excessive pain, swelling, redness or odor of or around the surgical area  · Temperature over 100.5  · Nausea and vomiting lasting longer than 4 hours or if unable to take medications  · Any signs of decreased circulation or nerve impairment to extremity: change in color, persistent  numbness, tingling, coldness or increase pain  · Any questions        What to do at Home:      *  Please give a list of your current medications to your Primary Care Provider. *  Please update this list whenever your medications are discontinued, doses are      changed, or new medications (including over-the-counter products) are added. *  Please carry medication information at all times in case of emergency situations. These are general instructions for a healthy lifestyle:    No smoking/ No tobacco products/ Avoid exposure to second hand smoke    Surgeon General's Warning:  Quitting smoking now greatly reduces serious risk to your health.     Obesity, smoking, and sedentary lifestyle greatly increases your risk for illness    A healthy diet, regular physical exercise & weight monitoring are important for maintaining a healthy lifestyle    You may be retaining fluid if you have a history of heart failure or if you experience any of the following symptoms:  Weight gain of 3 pounds or more overnight or 5 pounds in a week, increased swelling in our hands or feet or shortness of breath while lying flat in bed. Please call your doctor as soon as you notice any of these symptoms; do not wait until your next office visit. Recognize signs and symptoms of STROKE:    F-face looks uneven    A-arms unable to move or move unevenly    S-speech slurred or non-existent    T-time-call 911 as soon as signs and symptoms begin-DO NOT go       Back to bed or wait to see if you get better-TIME IS BRAIN. Warning Signs of HEART ATTACK     Call 911 if you have these symptoms:   Chest discomfort. Most heart attacks involve discomfort in the center of the chest that lasts more than a few minutes, or that goes away and comes back. It can feel like uncomfortable pressure, squeezing, fullness, or pain.  Discomfort in other areas of the upper body. Symptoms can include pain or discomfort in one or both arms, the back, neck, jaw, or stomach.  Shortness of breath with or without chest discomfort.  Other signs may include breaking out in a cold sweat, nausea, or lightheadedness. Don't wait more than five minutes to call 911 - MINUTES MATTER! Fast action can save your life. Calling 911 is almost always the fastest way to get lifesaving treatment. Emergency Medical Services staff can begin treatment when they arrive -- up to an hour sooner than if someone gets to the hospital by car. The discharge information has been reviewed with the patient and spouse. The patient and spouse verbalized understanding. Discharge medications reviewed with the patient and spouse and appropriate educational materials and side effects teaching were provided. Patient armband removed and shredded    MyChart Activation    Thank you for requesting access to [employer].  Please follow the instructions below to securely access and download your online medical record. [facility] allows you to send messages to your doctor, view your test results, renew your prescriptions, schedule appointments, and more. How Do I Sign Up? 1. In your internet browser, go to [email or web address]  2. Click on the First Time User? Click Here link in the Sign In box. You will be redirect to the New Member Sign Up page. 3. Enter your [facility] Access Code exactly as it appears below. You will not need to use this code after youve completed the sign-up process. If you do not sign up before the expiration date, you must request a new code. [facility] Access Code: Activation code not generated  Current [facility] Status: Active (This is the date your [facility]ark access code will )    4. Enter the last four digits of your Social Security Number (xxxx) and Date of Birth (mm/dd/yyyy) as indicated and click Submit. You will be taken to the next sign-up page. 5. Create a [facility]ark ID. This will be your [facility] login ID and cannot be changed, so think of one that is secure and easy to remember. 6. Create a [facility]ark password. You can change your password at any time. 7. Enter your Password Reset Question and Answer. This can be used at a later time if you forget your password. 8. Enter your e-mail address. You will receive e-mail notification when new information is available in [address] 9. Click Sign Up. You can now view and download portions of your medical record. 10. Click the Download Summary menu link to download a portable copy of your medical information. Additional Information    If you have questions, please visit the Frequently Asked Questions section of the [employer] website at [email or web address] Remember, [facility] is NOT to be used for urgent needs. For medical emergencies, dial 911.

## 2017-07-26 ENCOUNTER — HOSPITAL ENCOUNTER (OUTPATIENT)
Dept: NON INVASIVE DIAGNOSTICS | Age: 62
Discharge: HOME | End: 2017-07-26
Attending: INTERNAL MEDICINE
Payer: COMMERCIAL

## 2017-07-26 DIAGNOSIS — R06.02 SOB (SHORTNESS OF BREATH) ON EXERTION: ICD-10-CM

## 2017-07-26 DIAGNOSIS — R07.89 CHEST PRESSURE: ICD-10-CM

## 2017-07-26 LAB
ATTENDING PHYSICIAN, CST07: NORMAL
DIAGNOSIS, 93000: NORMAL
DUKE TM SCORE RESULT, CST14: NORMAL
DUKE TREADMILL SCORE, CST13: NORMAL
ECG INTERP BEFORE EX, CST11: NORMAL
ECG INTERP DURING EX, CST12: NORMAL
FUNCTIONAL CAPACITY, CST17: NORMAL
KNOWN CARDIAC CONDITION, CST08: NORMAL
MAX. DIASTOLIC BP, CST04: 84 MMHG
MAX. HEART RATE, CST05: 148 BPM
MAX. SYSTOLIC BP, CST03: 162 MMHG
OVERALL BP RESPONSE TO EXERCISE, CST16: NORMAL
OVERALL HR RESPONSE TO EXERCISE, CST15: NORMAL
PEAK EX METS, CST10: 11.2 METS
PROTOCOL NAME, CST01: NORMAL
TEST INDICATION, CST09: NORMAL

## 2017-07-26 PROCEDURE — C8930 TTE W OR W/O CONTR, CONT ECG: HCPCS

## 2017-07-26 PROCEDURE — 74011250636 HC RX REV CODE- 250/636: Performed by: INTERNAL MEDICINE

## 2017-07-26 RX ORDER — METOPROLOL SUCCINATE 25 MG/1
25 TABLET, EXTENDED RELEASE ORAL DAILY
Qty: 30 TAB | Refills: 3 | Status: SHIPPED | OUTPATIENT
Start: 2017-07-26 | End: 2017-08-16 | Stop reason: SDUPTHER

## 2017-07-26 RX ADMIN — PERFLUTREN 2 ML: 6.52 INJECTION, SUSPENSION INTRAVENOUS at 10:32

## 2017-07-28 ENCOUNTER — CLINICAL SUPPORT (OUTPATIENT)
Dept: CARDIOLOGY CLINIC | Age: 62
End: 2017-07-28

## 2017-07-28 DIAGNOSIS — R06.02 SOB (SHORTNESS OF BREATH) ON EXERTION: ICD-10-CM

## 2017-07-28 DIAGNOSIS — R07.89 CHEST PRESSURE: ICD-10-CM

## 2017-08-16 ENCOUNTER — OFFICE VISIT (OUTPATIENT)
Dept: CARDIOLOGY CLINIC | Age: 62
End: 2017-08-16

## 2017-08-16 VITALS
WEIGHT: 172 LBS | BODY MASS INDEX: 27.64 KG/M2 | HEIGHT: 66 IN | DIASTOLIC BLOOD PRESSURE: 62 MMHG | SYSTOLIC BLOOD PRESSURE: 120 MMHG | HEART RATE: 70 BPM

## 2017-08-16 DIAGNOSIS — R07.89 CHEST PRESSURE: ICD-10-CM

## 2017-08-16 DIAGNOSIS — I10 ESSENTIAL HYPERTENSION: ICD-10-CM

## 2017-08-16 DIAGNOSIS — R55 VASOVAGAL SYNCOPE: Primary | ICD-10-CM

## 2017-08-16 RX ORDER — METOPROLOL SUCCINATE 25 MG/1
25 TABLET, EXTENDED RELEASE ORAL DAILY
Qty: 90 TAB | Refills: 3 | Status: SHIPPED | OUTPATIENT
Start: 2017-08-16 | End: 2018-08-07 | Stop reason: SDUPTHER

## 2017-08-16 NOTE — PROGRESS NOTES
HISTORY OF PRESENT ILLNESS  Mel Leyden is a 64 y.o. female. HPI Comments: Patient is here for follow up of diagnostic tests. Results will be discussed. Chest Pain (Angina)    The history is provided by the patient. This is a new problem. The current episode started more than 1 week ago. The problem has not changed since onset. The problem occurs every several days. The pain is associated with exertion. The pain is present in the substernal region. The pain is mild. The quality of the pain is described as tightness. The pain does not radiate. Associated symptoms include dizziness. Dizziness   The history is provided by the patient. This is a new problem. The problem occurs every several days. The problem has not changed since onset. The symptoms are aggravated by exertion. Nothing relieves the symptoms. Shortness of Breath   The history is provided by the patient. This is a recurrent problem. The problem occurs intermittently. The problem has not changed since onset. Pertinent negatives include no wheezing, no rash and no leg swelling. The problem's precipitants include exercise. Review of Systems   Constitutional: Negative for chills. HENT: Negative for nosebleeds. Eyes: Negative for blurred vision and double vision. Respiratory: Negative for wheezing. Cardiovascular: Negative for leg swelling. Gastrointestinal: Negative for heartburn. Musculoskeletal: Negative for myalgias. Skin: Negative for rash. Neurological: Positive for dizziness. Endo/Heme/Allergies: Does not bruise/bleed easily.      Family History   Problem Relation Age of Onset    Heart Disease Father      a fib ablation/cva    Heart Attack Father     Stroke Father     Cancer Maternal Grandfather 61     colon       Past Medical History:   Diagnosis Date    Essential hypertension     Valvular heart disease     mvp       Past Surgical History:   Procedure Laterality Date    HX CATARACT REMOVAL      HX HYSTERECTOMY 2004    HX OOPHORECTOMY  2004    HX SHOULDER ARTHROSCOPY         Allergies   Allergen Reactions    Epinephrine Shortness of Breath       Current Outpatient Prescriptions   Medication Sig    metoprolol succinate (TOPROL-XL) 25 mg XL tablet Take 1 Tab by mouth daily.  calcium carbonate (CALTREX) 600 mg calcium (1,500 mg) tablet Take 600 mg by mouth two (2) times a day.  fluticasone (FLONASE) 50 mcg/actuation nasal spray 2 Sprays by Both Nostrils route daily.  rizatriptan (MAXALT) 5 mg tablet Take 5 mg by mouth once as needed for Migraine. May repeat in 2 hours if needed    raNITIdine (ZANTAC) 150 mg tablet Take 150 mg by mouth two (2) times a day.  calcium polycarbophil (FIBERCON) 625 mg tablet Take 625 mg by mouth daily.  estradiol (VAGIFEM) 10 mcg tab vaginal tablet Insert 10 mcg into vagina daily.  aspirin 81 mg tablet Take 81 mg by mouth daily.  multivitamin (ONE A DAY) tablet Take 1 Tab by mouth daily.  magnesium 250 mg Tab Take  by mouth daily.  synthetic conj estrogens a 0.3 mg tablet Take 0.3 mg by mouth daily.  calcium 100 mg cap Take  by mouth daily.  omeprazole (PRILOSEC) 40 mg capsule Take 40 mg by mouth daily.  fexofenadine (ALLEGRA) 180 mg tablet Take  by mouth daily. No current facility-administered medications for this visit. Visit Vitals    /62    Pulse 70    Ht 5' 6\" (1.676 m)    Wt 78 kg (172 lb)    BMI 27.76 kg/m2           Physical Exam   Constitutional: She is oriented to person, place, and time. She appears well-developed and well-nourished. HENT:   Head: Normocephalic and atraumatic. Eyes: Conjunctivae are normal.   Neck: Neck supple. No JVD present. No tracheal deviation present. No thyromegaly present. Cardiovascular: Normal rate and regular rhythm. PMI is not displaced. Exam reveals no gallop, no S3 and no decreased pulses. No murmur heard. Pulmonary/Chest: No respiratory distress. She has no wheezes.  She has no rales. She exhibits no tenderness. Abdominal: Soft. There is no tenderness. Musculoskeletal: She exhibits no edema. Neurological: She is alert and oriented to person, place, and time. Skin: Skin is warm. Psychiatric: She has a normal mood and affect. CARDIOLOGY STUDIES 7/10/2013   Echocardiogram - Complete Result normal ef,no doppler abnormality   Some recent data might be hidden   7/2017-stress echo  Impressions: Normal study after maximal exercise. Echo impressions: There was no diagnostic evidence for stress-induced  ischemia. SUMMARY:echo-7/2017  Left ventricle: Systolic function was normal. Ejection fraction was  estimated in the range of 55 % to 60 %. There were no regional wall motion  abnormalities. Doppler parameters were consistent with abnormal left  ventricular relaxation (grade 1 diastolic dysfunction). Right ventricle: The ventricle was mildly dilated. Mitral valve: There was mild annular calcification. There was mild  regurgitation. Tricuspid valve: There was mild regurgitation. Pulmonic valve: There was mild regurgitation. Francis Mcknight MD   Cardiology 7/2017  Pre-procedure Diagnoses:   1. Dizziness [R42]   Post-procedure Diagnoses:   1. Vasovagal near syncope [R55]   Procedures:   1. TILT TABLE EVALUATION [[id number]]      []Hide copied text  []Hover for attribution information  Tilt table performed  Patient was in upright position for about 5 min after hydration  Her bp quickly dropped from 852'Y systolic to 39'X systolic,patient was cold clammy -put in supine position with resolution of symptoms  No significant change in heart rate  Imp  Positive tilt table test  Vasovagal-vaso depressive responce            Assessment       ICD-10-CM ICD-9-CM    1. Vasovagal syncope R55 780.2     positive tilt table  vasodepressive syncope  stable on low dose beta blockers   2. Essential hypertension I10 401.9     stable   3.  Chest pressure R07.89 786.59     negative stress echo On low dose beta blockers for vasovagal syncope  If needed add florinef  Medications Discontinued During This Encounter   Medication Reason    metoprolol succinate (TOPROL-XL) 25 mg XL tablet Reorder       Orders Placed This Encounter    metoprolol succinate (TOPROL-XL) 25 mg XL tablet     Sig: Take 1 Tab by mouth daily. Dispense:  90 Tab     Refill:  3       Follow-up Disposition:  Return in about 6 months (around 2/16/2018).

## 2017-08-16 NOTE — MR AVS SNAPSHOT
Visit Information Date & Time Provider Department Dept. Phone Encounter #  
 8/16/2017 12:30 PM Sofya Clement MD [facility] [address] [place] [address] [id number] Follow-up Instructions Return in about 6 months (around 2/16/2018). Your Appointments 2/21/2018  9:45 AM  
Office Visit with Sofya Clement MD  
[facility]) Appt Note: [address] [facility], [address] [place] [address]  
[address] [place] [address], [address] [place] [address] Upcoming Health Maintenance Date Due Hepatitis C Screening 1955 DTaP/Tdap/Td series (1 - Tdap) 12/29/1976 PAP AKA CERVICAL CYTOLOGY 12/29/1976 FOBT Q 1 YEAR AGE 50-75 12/29/2005 ZOSTER VACCINE AGE 60> 10/29/2015 INFLUENZA AGE 9 TO ADULT 8/1/2017 BREAST CANCER SCRN MAMMOGRAM 1/30/2019 Allergies as of 8/16/2017  Review Complete On: 8/16/2017 By: Sofya Clement MD  
  
 Severity Noted Reaction Type Reactions Epinephrine Medium 06/07/2013   Not Verified Shortness of Breath Current Immunizations  Never Reviewed No immunizations on file. Not reviewed this visit You Were Diagnosed With   
  
 Codes Comments Vasovagal syncope    -  Primary ICD-10-CM: R55 
ICD-9-CM: 780.2 positive tilt table 
vasodepressive syncope 
stable on low dose beta blockers Essential hypertension     ICD-10-CM: I10 
ICD-9-CM: 401.9 stable Chest pressure     ICD-10-CM: R07.89 ICD-9-CM: 786.59 negative stress echo Vitals BP Pulse Height(growth percentile) Weight(growth percentile) BMI Smoking Status 120/62 70 5' 6\" (1.676 m) 172 lb (78 kg) 27.76 kg/m2 Never Smoker Vitals History BMI and BSA Data Body Mass Index Body Surface Area  
 27.76 kg/m 2 1.91 m 2 Preferred Pharmacy Pharmacy Name Phone [address] [facility] [address] [phone] Your Updated Medication List  
  
   
This list is accurate as of: 8/16/17 12:44 PM.  Always use your most recent med list.  
  
  
  
  
 aspirin 81 mg tablet Take 81 mg by mouth daily. calcium 100 mg Cap Take  by mouth daily. calcium carbonate 600 mg calcium (1,500 mg) tablet Commonly known as:  Lesley Gela Take 600 mg by mouth two (2) times a day. calcium polycarbophil 625 mg tablet Commonly known as:  Arlene Dilling Take 625 mg by mouth daily. fexofenadine 180 mg tablet Commonly known as:  Elinore Tracey Take  by mouth daily. FLONASE 50 mcg/actuation nasal spray Generic drug:  fluticasone 2 Sprays by Both Nostrils route daily. magnesium 250 mg Tab Take  by mouth daily. MAXALT 5 mg tablet Generic drug:  rizatriptan Take 5 mg by mouth once as needed for Migraine. May repeat in 2 hours if needed  
  
 metoprolol succinate 25 mg XL tablet Commonly known as:  TOPROL-XL Take 1 Tab by mouth daily. multivitamin tablet Commonly known as:  ONE A DAY Take 1 Tab by mouth daily. omeprazole 40 mg capsule Commonly known as:  PRILOSEC Take 40 mg by mouth daily. synthetic conj estrogens a 0.3 mg tablet Take 0.3 mg by mouth daily. VAGIFEM 10 mcg Tab vaginal tablet Generic drug:  estradiol Insert 10 mcg into vagina daily. ZANTAC 150 mg tablet Generic drug:  raNITIdine Take 150 mg by mouth two (2) times a day. Prescriptions Sent to Pharmacy Refills  
 metoprolol succinate (TOPROL-XL) 25 mg XL tablet 3 Sig: Take 1 Tab by mouth daily. Class: Normal  
 Pharmacy: [address] [place] [address] #: [phone] Route: Oral  
  
Follow-up Instructions Return in about 6 months (around 2/16/2018). Introducing hospitals & HEALTH SERVICES! Dear Venkat Mcbride: Thank you for requesting a Xpresot account.   Our records indicate that you already have an active Backblaze account. You can access your account anytime at [email or web address] Did you know that you can access your hospital and ER discharge instructions at any time in Backblaze? You can also review all of your test results from your hospital stay or ER visit. Additional Information If you have questions, please visit the Frequently Asked Questions section of the Backblaze website at [email or web address] Remember, Backblaze is NOT to be used for urgent needs. For medical emergencies, dial 911. Now available from your iPhone and Android! Please provide this summary of care documentation to your next provider. Your primary care clinician is listed as Bhupinder Barajas. If you have any questions after today's visit, please call [phone].

## 2017-08-16 NOTE — LETTER
Fadia Ulloa 1955 8/16/2017 Dear Liset Montana MD 
 
I had the pleasure of evaluating  Ms. Eden in office today. Below are the relevant portions of my assessment and plan of care. ICD-10-CM ICD-9-CM 1. Vasovagal syncope R55 780.2 positive tilt table 
vasodepressive syncope 
stable on low dose beta blockers 2. Essential hypertension I10 401.9   
 stable 3. Chest pressure R07.89 786.59   
 negative stress echo Current Outpatient Prescriptions Medication Sig Dispense Refill  metoprolol succinate (TOPROL-XL) 25 mg XL tablet Take 1 Tab by mouth daily. 90 Tab 3  
 calcium carbonate (CALTREX) 600 mg calcium (1,500 mg) tablet Take 600 mg by mouth two (2) times a day.  fluticasone (FLONASE) 50 mcg/actuation nasal spray 2 Sprays by Both Nostrils route daily.  rizatriptan (MAXALT) 5 mg tablet Take 5 mg by mouth once as needed for Migraine. May repeat in 2 hours if needed  raNITIdine (ZANTAC) 150 mg tablet Take 150 mg by mouth two (2) times a day.  calcium polycarbophil (FIBERCON) 625 mg tablet Take 625 mg by mouth daily.  estradiol (VAGIFEM) 10 mcg tab vaginal tablet Insert 10 mcg into vagina daily.  aspirin 81 mg tablet Take 81 mg by mouth daily.  multivitamin (ONE A DAY) tablet Take 1 Tab by mouth daily.  magnesium 250 mg Tab Take  by mouth daily.  synthetic conj estrogens a 0.3 mg tablet Take 0.3 mg by mouth daily.  calcium 100 mg cap Take  by mouth daily.  omeprazole (PRILOSEC) 40 mg capsule Take 40 mg by mouth daily.  fexofenadine (ALLEGRA) 180 mg tablet Take  by mouth daily. Orders Placed This Encounter  metoprolol succinate (TOPROL-XL) 25 mg XL tablet Sig: Take 1 Tab by mouth daily. Dispense:  90 Tab Refill:  3 If you have questions, please do not hesitate to call me. I look forward to following Ms. Eden along with you. Sincerely, Shala Morgan MD

## 2018-02-15 ENCOUNTER — HOSPITAL ENCOUNTER (OUTPATIENT)
Dept: MAMMOGRAPHY | Age: 63
Discharge: HOME | End: 2018-02-15
Attending: INTERNAL MEDICINE
Payer: COMMERCIAL

## 2018-02-15 DIAGNOSIS — Z12.39 BREAST CANCER SCREENING: ICD-10-CM

## 2018-02-15 PROCEDURE — 77067 SCR MAMMO BI INCL CAD: CPT

## 2018-02-21 ENCOUNTER — OFFICE VISIT (OUTPATIENT)
Dept: CARDIOLOGY CLINIC | Age: 63
End: 2018-02-21

## 2018-02-21 VITALS
WEIGHT: 174 LBS | BODY MASS INDEX: 27.97 KG/M2 | HEIGHT: 66 IN | SYSTOLIC BLOOD PRESSURE: 131 MMHG | HEART RATE: 78 BPM | DIASTOLIC BLOOD PRESSURE: 63 MMHG

## 2018-02-21 DIAGNOSIS — R55 VASOVAGAL SYNCOPE: Primary | ICD-10-CM

## 2018-02-21 DIAGNOSIS — R00.2 PALPITATIONS: ICD-10-CM

## 2018-02-21 DIAGNOSIS — I10 ESSENTIAL HYPERTENSION: ICD-10-CM

## 2018-02-21 RX ORDER — CALCIUM CARBONATE 200(500)MG
1 TABLET,CHEWABLE ORAL DAILY
COMMUNITY
End: 2021-04-15

## 2018-02-21 NOTE — LETTER
James Jauregui 1955 2/21/2018 Dear Maegan Vick MD 
 
I had the pleasure of evaluating  Ms. Eden in office today. Below are the relevant portions of my assessment and plan of care. ICD-10-CM ICD-9-CM 1. Vasovagal syncope R55 780.2   
 stable on beta blockers 2. Essential hypertension I10 401.9   
 controlled 3. Palpitations R00.2 785.1   
 stable 
rare episodes Current Outpatient Prescriptions Medication Sig Dispense Refill  calcium carbonate (TUMS) 200 mg calcium (500 mg) chew Take 1 Tab by mouth daily.  metoprolol succinate (TOPROL-XL) 25 mg XL tablet Take 1 Tab by mouth daily. 90 Tab 3  
 calcium carbonate (CALTREX) 600 mg calcium (1,500 mg) tablet Take 600 mg by mouth two (2) times a day.  rizatriptan (MAXALT) 5 mg tablet Take 5 mg by mouth once as needed for Migraine. May repeat in 2 hours if needed  calcium polycarbophil (FIBERCON) 625 mg tablet Take 625 mg by mouth daily.  estradiol (VAGIFEM) 10 mcg tab vaginal tablet Insert 10 mcg into vagina daily.  aspirin 81 mg tablet Take 81 mg by mouth daily.  multivitamin (ONE A DAY) tablet Take 1 Tab by mouth daily.  magnesium 250 mg Tab Take  by mouth daily.  synthetic conj estrogens a 0.3 mg tablet Take 0.3 mg by mouth daily.  calcium 100 mg cap Take  by mouth daily.  omeprazole (PRILOSEC) 40 mg capsule Take 40 mg by mouth daily.  fexofenadine (ALLEGRA) 180 mg tablet Take  by mouth daily. Orders Placed This Encounter  calcium carbonate (TUMS) 200 mg calcium (500 mg) chew Sig: Take 1 Tab by mouth daily. If you have questions, please do not hesitate to call me. I look forward to following Ms. Eden along with you. Sincerely, Lester Field MD

## 2018-02-21 NOTE — MR AVS SNAPSHOT
Anaid Chi 
 
 
 [address], [address] [facility] [address] 
[phone] Patient: Diamante Lott MRN: [id number] :1955 Visit Information Date & Time Provider Department Dept. Phone Encounter #  
 2018  9:45 AM Carvin Ganser, MD [facility] [address] [place] [address] [id number] Follow-up Instructions Return in about 1 year (around 2019). Your Appointments 2019  9:30 AM  
ESTABLISHED PATIENT with Carvin Ganser, MD  
[facility]) Appt Note: 1 yr  
 [address], [address] [facility] [address]  
1338 [facility], [address] Upcoming Health Maintenance Date Due Hepatitis C Screening 1955 DTaP/Tdap/Td series (1 - Tdap) 1976 PAP AKA CERVICAL CYTOLOGY 1976 FOBT Q 1 YEAR AGE 50-75 2005 ZOSTER VACCINE AGE 60> 10/29/2015 Influenza Age 5 to Adult 2017 BREAST CANCER SCRN MAMMOGRAM 2019 Allergies as of 2018  Review Complete On: 2018 By: Carvin Ganser, MD  
  
 Severity Noted Reaction Type Reactions Epinephrine Medium 2013   Not Verified Shortness of Breath Current Immunizations  Never Reviewed No immunizations on file. Not reviewed this visit You Were Diagnosed With   
  
 Codes Comments Vasovagal syncope    -  Primary ICD-10-CM: R55 
ICD-9-CM: 780.2 stable on beta blockers Essential hypertension     ICD-10-CM: I10 
ICD-9-CM: 401.9 controlled Palpitations     ICD-10-CM: R00.2 ICD-9-CM: 785.1 stable 
rare episodes Vitals BP Pulse Height(growth percentile) Weight(growth percentile) LMP BMI  
 131/63 78 5' 6\" (1.676 m) 174 lb (78.9 kg) (Exact Date) 28.08 kg/m2 OB Status Smoking Status Having regular periods Never Smoker Vitals History BMI and BSA Data Body Mass Index Body Surface Area 28.08 kg/m 2 1.92 m 2 Preferred Pharmacy Pharmacy Name Phone [address] [place]20 [facility] [phone] Your Updated Medication List  
  
   
This list is accurate as of 2/21/18 10:08 AM.  Always use your most recent med list.  
  
  
  
  
 aspirin 81 mg tablet Take 81 mg by mouth daily. calcium 100 mg Cap Take  by mouth daily. calcium carbonate 200 mg calcium (500 mg) Chew Commonly known as:  TUMS Take 1 Tab by mouth daily. calcium carbonate 600 mg calcium (1,500 mg) tablet Commonly known as:  Dinah [place] Take 600 mg by mouth two (2) times a day. calcium polycarbophil 625 mg tablet Commonly known as:  Donnal Bees Take 625 mg by mouth daily. fexofenadine 180 mg tablet Commonly known as:  Amy Sprinkle Take  by mouth daily. magnesium 250 mg Tab Take  by mouth daily. MAXALT 5 mg tablet Generic drug:  rizatriptan Take 5 mg by mouth once as needed for Migraine. May repeat in 2 hours if needed  
  
 metoprolol succinate 25 mg XL tablet Commonly known as:  TOPROL-XL Take 1 Tab by mouth daily. multivitamin tablet Commonly known as:  ONE A DAY Take 1 Tab by mouth daily. omeprazole 40 mg capsule Commonly known as:  PRILOSEC Take 40 mg by mouth daily. synthetic conj estrogens a 0.3 mg tablet Take 0.3 mg by mouth daily. VAGIFEM 10 mcg Tab vaginal tablet Generic drug:  estradiol Insert 10 mcg into vagina daily. Follow-up Instructions Return in about 1 year (around 2/21/2019). Introducing [facility]! Dear Remedios [place]: Thank you for requesting a [employer] account. Our records indicate that you already have an active [employer] account. You can access your account anytime at [email or web address] Did you know that you can access your hospital and ER discharge instructions at any time in [employer]? You can also review all of your test results from your hospital stay or ER visit. Additional Information If you have questions, please visit the Frequently Asked Questions section of the [employer] website at [email or web address] Remember, [employer] is NOT to be used for urgent needs. For medical emergencies, dial 911. Now available from your iPhone and Android! Please provide this summary of care documentation to your next provider. Your primary care clinician is listed as Brooke Goyal. If you have any questions after today's visit, please call [phone].

## 2018-02-21 NOTE — PROGRESS NOTES
Patient didn't bring medications, verbally reviewed    1. Have you been to the ER, urgent care clinic since your last visit? Hospitalized since your last visit? No    2. Have you seen or consulted any other health care providers outside of the [address] [place] [address] since your last visit? Include any pap smears or colon screening.  Yes Where: PCP Routine

## 2018-02-21 NOTE — PROGRESS NOTES
HISTORY OF PRESENT ILLNESS  Jihan Goodman is a 58 y.o. female. HPI Comments: Patient is here for follow up of diagnostic tests. Results will be discussed. Dizziness   The history is provided by the patient. This is a new problem. The problem occurs rarely. The problem has been rapidly improving. Associated symptoms include shortness of breath. Pertinent negatives include no chest pain, no abdominal pain and no headaches. The symptoms are aggravated by exertion. Nothing relieves the symptoms. Shortness of Breath   The history is provided by the patient. This is a recurrent problem. The problem occurs intermittently. The problem has been rapidly improving. Pertinent negatives include no fever, no headaches, no cough, no sputum production, no hemoptysis, no wheezing, no PND, no orthopnea, no chest pain, no vomiting, no abdominal pain, no rash, no leg swelling and no claudication. The problem's precipitants include exercise. Review of Systems   Constitutional: Negative for chills and fever. HENT: Negative for nosebleeds. Eyes: Negative for blurred vision and double vision. Respiratory: Positive for shortness of breath. Negative for cough, hemoptysis, sputum production and wheezing. Cardiovascular: Negative for chest pain, palpitations, orthopnea, claudication, leg swelling and PND. Gastrointestinal: Negative for abdominal pain, heartburn, nausea and vomiting. Musculoskeletal: Negative for myalgias. Skin: Negative for rash. Neurological: Positive for dizziness. Negative for weakness and headaches. Endo/Heme/Allergies: Does not bruise/bleed easily.      Family History   Problem Relation Age of Onset    Heart Disease Father      a fib ablation/cva    Heart Attack Father     Stroke Father     Cancer Maternal Grandfather 61     colon    Breast Cancer Mother        Past Medical History:   Diagnosis Date    Essential hypertension     Valvular heart disease     mvp       Past Surgical History:   Procedure Laterality Date    HX CATARACT REMOVAL      HX HYSTERECTOMY  2004    HX OOPHORECTOMY  2004    HX SHOULDER ARTHROSCOPY         Allergies   Allergen Reactions    Epinephrine Shortness of Breath       Current Outpatient Prescriptions   Medication Sig    calcium carbonate (TUMS) 200 mg calcium (500 mg) chew Take 1 Tab by mouth daily.  metoprolol succinate (TOPROL-XL) 25 mg XL tablet Take 1 Tab by mouth daily.  calcium carbonate (CALTREX) 600 mg calcium (1,500 mg) tablet Take 600 mg by mouth two (2) times a day.  rizatriptan (MAXALT) 5 mg tablet Take 5 mg by mouth once as needed for Migraine. May repeat in 2 hours if needed    calcium polycarbophil (FIBERCON) 625 mg tablet Take 625 mg by mouth daily.  estradiol (VAGIFEM) 10 mcg tab vaginal tablet Insert 10 mcg into vagina daily.  aspirin 81 mg tablet Take 81 mg by mouth daily.  multivitamin (ONE A DAY) tablet Take 1 Tab by mouth daily.  magnesium 250 mg Tab Take  by mouth daily.  synthetic conj estrogens a 0.3 mg tablet Take 0.3 mg by mouth daily.  calcium 100 mg cap Take  by mouth daily.  omeprazole (PRILOSEC) 40 mg capsule Take 40 mg by mouth daily.  fexofenadine (ALLEGRA) 180 mg tablet Take  by mouth daily. No current facility-administered medications for this visit. Visit Vitals    /63    Pulse 78    Ht 5' 6\" (1.676 m)    Wt 78.9 kg (174 lb)    LMP  (Exact Date)    BMI 28.08 kg/m2           Physical Exam   Constitutional: She is oriented to person, place, and time. She appears well-developed and well-nourished. HENT:   Head: Normocephalic and atraumatic. Eyes: Conjunctivae are normal.   Neck: Neck supple. No JVD present. No tracheal deviation present. No thyromegaly present. Cardiovascular: Normal rate and regular rhythm. PMI is not displaced. Exam reveals no gallop, no S3 and no decreased pulses. No murmur heard. Pulmonary/Chest: No respiratory distress.  She has no wheezes. She has no rales. She exhibits no tenderness. Abdominal: Soft. There is no tenderness. Musculoskeletal: She exhibits no edema. Neurological: She is alert and oriented to person, place, and time. Skin: Skin is warm. Psychiatric: She has a normal mood and affect. CARDIOLOGY STUDIES 7/10/2013   Echocardiogram - Complete Result normal ef,no doppler abnormality   Some recent data might be hidden   7/2017-stress echo  Impressions: Normal study after maximal exercise. Echo impressions: There was no diagnostic evidence for stress-induced  ischemia. SUMMARY:echo-7/2017  Left ventricle: Systolic function was normal. Ejection fraction was  estimated in the range of 55 % to 60 %. There were no regional wall motion  abnormalities. Doppler parameters were consistent with abnormal left  ventricular relaxation (grade 1 diastolic dysfunction). Right ventricle: The ventricle was mildly dilated. Mitral valve: There was mild annular calcification. There was mild  regurgitation. Tricuspid valve: There was mild regurgitation. Pulmonic valve: There was mild regurgitation. Chelsy Cristina MD   Cardiology 7/2017  Pre-procedure Diagnoses:   1. Dizziness [R42]   Post-procedure Diagnoses:   1. Vasovagal near syncope [R55]   Procedures:   1. TILT TABLE EVALUATION [[id number]]      []Hide copied text  []Hover for attribution information  Tilt table performed  Patient was in upright position for about 5 min after hydration  Her bp quickly dropped from 031'B systolic to 16'J systolic,patient was cold clammy -put in supine position with resolution of symptoms  No significant change in heart rate  Imp  Positive tilt table test  Vasovagal-vaso depressive responce            Assessment       ICD-10-CM ICD-9-CM    1. Vasovagal syncope R55 780.2     stable  on beta blockers   2. Essential hypertension I10 401.9     controlled   3.  Palpitations R00.2 785.1     stable  rare episodes     On low dose beta blockers for vasovagal syncope  If needed add florinef    2/2018  symptoms controlled with beta blockers  Medications Discontinued During This Encounter   Medication Reason    raNITIdine (ZANTAC) 150 mg tablet Not A Current Medication    fluticasone (FLONASE) 50 mcg/actuation nasal spray Not A Current Medication       No orders of the defined types were placed in this encounter. Follow-up Disposition:  Return in about 1 year (around 2/21/2019).

## 2018-04-06 ENCOUNTER — IMPORTED ENCOUNTER (OUTPATIENT)
Dept: URBAN - METROPOLITAN AREA CLINIC 9 | Facility: CLINIC | Age: 63
End: 2018-04-06

## 2018-08-07 RX ORDER — METOPROLOL SUCCINATE 25 MG/1
TABLET, EXTENDED RELEASE ORAL
Qty: 90 TAB | Refills: 0 | Status: SHIPPED | OUTPATIENT
Start: 2018-08-07 | End: 2018-11-03 | Stop reason: SDUPTHER

## 2018-09-23 ENCOUNTER — HOSPITAL ENCOUNTER (OUTPATIENT)
Age: 63
Discharge: HOME | End: 2018-09-23
Attending: PHYSICIAN ASSISTANT
Payer: COMMERCIAL

## 2018-09-23 DIAGNOSIS — M25.562 LEFT KNEE PAIN: ICD-10-CM

## 2018-09-23 DIAGNOSIS — M23.92 INTERNAL DERANGEMENT OF LEFT KNEE: ICD-10-CM

## 2018-09-23 DIAGNOSIS — M17.12 PRIMARY OSTEOARTHRITIS OF LEFT KNEE: ICD-10-CM

## 2018-09-23 PROCEDURE — 73721 MRI JNT OF LWR EXTRE W/O DYE: CPT

## 2018-11-03 RX ORDER — METOPROLOL SUCCINATE 25 MG/1
TABLET, EXTENDED RELEASE ORAL
Qty: 90 TAB | Refills: 0 | Status: SHIPPED | OUTPATIENT
Start: 2018-11-03 | End: 2019-01-30 | Stop reason: SDUPTHER

## 2018-11-20 ENCOUNTER — HOSPITAL ENCOUNTER (OUTPATIENT)
Dept: LAB | Age: 63
Discharge: HOME | End: 2018-11-20
Payer: COMMERCIAL

## 2018-11-20 LAB
APPEARANCE UR: CLEAR
BILIRUB UR QL: NEGATIVE
COLOR UR: YELLOW
GLUCOSE UR STRIP.AUTO-MCNC: NEGATIVE MG/DL
HGB UR QL STRIP: NEGATIVE
KETONES UR QL STRIP.AUTO: NEGATIVE MG/DL
LEUKOCYTE ESTERASE UR QL STRIP.AUTO: NEGATIVE
NITRITE UR QL STRIP.AUTO: NEGATIVE
PH UR STRIP: 7.5 [PH] (ref 5–8)
PROT UR STRIP-MCNC: NEGATIVE MG/DL
SP GR UR REFRACTOMETRY: 1 (ref 1–1.03)
UROBILINOGEN UR QL STRIP.AUTO: 0.2 EU/DL (ref 0.2–1)

## 2018-11-20 PROCEDURE — 36415 COLL VENOUS BLD VENIPUNCTURE: CPT

## 2018-11-20 PROCEDURE — 81003 URINALYSIS AUTO W/O SCOPE: CPT

## 2018-11-20 PROCEDURE — 87086 URINE CULTURE/COLONY COUNT: CPT

## 2018-11-22 LAB
BACTERIA SPEC CULT: NORMAL
SERVICE CMNT-IMP: NORMAL

## 2019-01-30 RX ORDER — METOPROLOL SUCCINATE 25 MG/1
TABLET, EXTENDED RELEASE ORAL
Qty: 90 TAB | Refills: 0 | Status: SHIPPED | OUTPATIENT
Start: 2019-01-30 | End: 2019-04-29 | Stop reason: SDUPTHER

## 2019-03-04 ENCOUNTER — HOSPITAL ENCOUNTER (OUTPATIENT)
Dept: MAMMOGRAPHY | Age: 64
Discharge: HOME | End: 2019-03-04
Attending: INTERNAL MEDICINE
Payer: COMMERCIAL

## 2019-03-04 DIAGNOSIS — Z12.31 VISIT FOR SCREENING MAMMOGRAM: ICD-10-CM

## 2019-03-04 PROCEDURE — 77063 BREAST TOMOSYNTHESIS BI: CPT

## 2019-03-08 ENCOUNTER — OFFICE VISIT (OUTPATIENT)
Dept: CARDIOLOGY CLINIC | Age: 64
End: 2019-03-08

## 2019-03-08 VITALS
HEIGHT: 66 IN | BODY MASS INDEX: 27.48 KG/M2 | SYSTOLIC BLOOD PRESSURE: 143 MMHG | WEIGHT: 171 LBS | DIASTOLIC BLOOD PRESSURE: 70 MMHG | HEART RATE: 68 BPM

## 2019-03-08 DIAGNOSIS — R07.89 CHEST PRESSURE: ICD-10-CM

## 2019-03-08 DIAGNOSIS — R55 VASOVAGAL SYNCOPE: Primary | ICD-10-CM

## 2019-03-08 DIAGNOSIS — I10 ESSENTIAL HYPERTENSION: ICD-10-CM

## 2019-03-08 RX ORDER — CETIRIZINE HCL 10 MG
TABLET ORAL
COMMUNITY
End: 2022-03-03

## 2019-03-08 NOTE — PROGRESS NOTES
Patient didn't bring medications, verbally reviewed. 1. Have you been to the ER, urgent care clinic since your last visit? Hospitalized since your last visit? No    2. Have you seen or consulted any other health care providers outside of the [address] [place] [address] since your last visit? Include any pap smears or colon screening.  Dermatology/Routine

## 2019-03-08 NOTE — LETTER
Sachin Francis 1955 
 
3/8/2019 Dear Anette Garrison MD 
 
I had the pleasure of evaluating  Ms. Eden in office today. Below are the relevant portions of my assessment and plan of care. ICD-10-CM ICD-9-CM 1. Vasovagal syncope R55 780.2 Stable maintained on beta-blocker continue therapy 2. Essential hypertension I10 401.9 Stable 3. Chest pressure R07.89 786.59 Stable Current Outpatient Medications Medication Sig Dispense Refill  ranitidine HCl (ACID CONTROL, RANITIDINE, PO) Take  by mouth.  cetirizine (ZYRTEC) 10 mg tablet Take  by mouth.  metoprolol succinate (TOPROL-XL) 25 mg XL tablet TAKE 1 TABLET BY MOUTH DAILY 90 Tab 0  
 calcium carbonate (TUMS) 200 mg calcium (500 mg) chew Take 1 Tab by mouth daily.  calcium carbonate (CALTREX) 600 mg calcium (1,500 mg) tablet Take 600 mg by mouth two (2) times a day.  rizatriptan (MAXALT) 5 mg tablet Take 5 mg by mouth once as needed for Migraine. May repeat in 2 hours if needed  estradiol (VAGIFEM) 10 mcg tab vaginal tablet Insert 10 mcg into vagina daily.  aspirin 81 mg tablet Take 81 mg by mouth daily.  multivitamin (ONE A DAY) tablet Take 1 Tab by mouth daily.  magnesium 250 mg Tab Take  by mouth daily.  calcium 100 mg cap Take  by mouth daily. Orders Placed This Encounter  ranitidine HCl (ACID CONTROL, RANITIDINE, PO) Sig: Take  by mouth.  cetirizine (ZYRTEC) 10 mg tablet Sig: Take  by mouth. If you have questions, please do not hesitate to call me. I look forward to following Ms. Eden along with you. Sincerely, Jeffrey Madrigal MD

## 2019-03-08 NOTE — PROGRESS NOTES
HISTORY OF PRESENT ILLNESS  Gia Chavez is a 61 y.o. female. Dizziness   The history is provided by the patient. This is a new problem. The problem occurs rarely. The problem has been rapidly improving. Associated symptoms include shortness of breath. Pertinent negatives include no chest pain, no abdominal pain and no headaches. The symptoms are aggravated by exertion. Nothing relieves the symptoms. Shortness of Breath   The history is provided by the patient. This is a recurrent problem. The problem occurs intermittently. The problem has been rapidly improving. Associated symptoms include cough. Pertinent negatives include no fever, no headaches, no sputum production, no hemoptysis, no wheezing, no PND, no orthopnea, no chest pain, no vomiting, no abdominal pain, no rash, no leg swelling and no claudication. The problem's precipitants include exercise. Review of Systems   Constitutional: Negative for chills and fever. HENT: Negative for nosebleeds. Eyes: Negative for blurred vision and double vision. Respiratory: Positive for cough and shortness of breath. Negative for hemoptysis, sputum production and wheezing. Cardiovascular: Negative for chest pain, palpitations, orthopnea, claudication, leg swelling and PND. Gastrointestinal: Negative for abdominal pain, heartburn, nausea and vomiting. Musculoskeletal: Negative for myalgias. Skin: Negative for rash. Neurological: Positive for dizziness. Negative for weakness and headaches. Endo/Heme/Allergies: Does not bruise/bleed easily.      Family History   Problem Relation Age of Onset    Heart Disease Father         a fib ablation/cva    Heart Attack Father     Stroke Father     Cancer Maternal Grandfather 61        colon    Breast Cancer Mother        Past Medical History:   Diagnosis Date    Essential hypertension     Valvular heart disease     mvp       Past Surgical History:   Procedure Laterality Date    HX CATARACT REMOVAL  HX HYSTERECTOMY  2004    HX OOPHORECTOMY  2004    HX SHOULDER ARTHROSCOPY         Allergies   Allergen Reactions    Epinephrine Shortness of Breath       Current Outpatient Medications   Medication Sig    ranitidine HCl (ACID CONTROL, RANITIDINE, PO) Take  by mouth.  cetirizine (ZYRTEC) 10 mg tablet Take  by mouth.  metoprolol succinate (TOPROL-XL) 25 mg XL tablet TAKE 1 TABLET BY MOUTH DAILY    calcium carbonate (TUMS) 200 mg calcium (500 mg) chew Take 1 Tab by mouth daily.  calcium carbonate (CALTREX) 600 mg calcium (1,500 mg) tablet Take 600 mg by mouth two (2) times a day.  rizatriptan (MAXALT) 5 mg tablet Take 5 mg by mouth once as needed for Migraine. May repeat in 2 hours if needed    estradiol (VAGIFEM) 10 mcg tab vaginal tablet Insert 10 mcg into vagina daily.  aspirin 81 mg tablet Take 81 mg by mouth daily.  multivitamin (ONE A DAY) tablet Take 1 Tab by mouth daily.  magnesium 250 mg Tab Take  by mouth daily.  calcium 100 mg cap Take  by mouth daily. No current facility-administered medications for this visit. Visit Vitals  /70   Pulse 68   Ht 5' 6\" (1.676 m)   Wt 77.6 kg (171 lb)   BMI 27.60 kg/m²           Physical Exam   Constitutional: She is oriented to person, place, and time. She appears well-developed and well-nourished. HENT:   Head: Normocephalic and atraumatic. Eyes: Conjunctivae are normal.   Neck: Neck supple. No JVD present. No tracheal deviation present. No thyromegaly present. Cardiovascular: Normal rate and regular rhythm. PMI is not displaced. Exam reveals no gallop, no S3 and no decreased pulses. No murmur heard. Pulmonary/Chest: No respiratory distress. She has no wheezes. She has no rales. She exhibits no tenderness. Abdominal: Soft. There is no tenderness. Musculoskeletal: She exhibits no edema. Neurological: She is alert and oriented to person, place, and time. Skin: Skin is warm.    Psychiatric: She has a normal mood and affect. CARDIOLOGY STUDIES 7/10/2013   Echocardiogram - Complete Result normal ef,no doppler abnormality   Some recent data might be hidden   7/2017-stress echo  Impressions: Normal study after maximal exercise. Echo impressions: There was no diagnostic evidence for stress-induced  ischemia. SUMMARY:echo-7/2017  Left ventricle: Systolic function was normal. Ejection fraction was  estimated in the range of 55 % to 60 %. There were no regional wall motion  abnormalities. Doppler parameters were consistent with abnormal left  ventricular relaxation (grade 1 diastolic dysfunction). Right ventricle: The ventricle was mildly dilated. Mitral valve: There was mild annular calcification. There was mild  regurgitation. Tricuspid valve: There was mild regurgitation. Pulmonic valve: There was mild regurgitation. Ivette Barraza MD   Cardiology 7/2017  Pre-procedure Diagnoses:   1. Dizziness [R42]   Post-procedure Diagnoses:   1. Vasovagal near syncope [R55]   Procedures:   1. TILT TABLE EVALUATION [[id number]]      []Hide copied text  []Hover for attribution information  Tilt table performed  Patient was in upright position for about 5 min after hydration  Her bp quickly dropped from 798'L systolic to 52'M systolic,patient was cold clammy -put in supine position with resolution of symptoms  No significant change in heart rate  Imp  Positive tilt table test  Vasovagal-vaso depressive responce            Assessment       ICD-10-CM ICD-9-CM    1. Vasovagal syncope R55 780.2     Stable maintained on beta-blocker continue therapy   2. Essential hypertension I10 401.9     Stable   3.  Chest pressure R07.89 786.59     Stable     On low dose beta blockers for vasovagal syncope  If needed add florinef    2/2018  symptoms controlled with beta blockers    3/2019  Cardiac status stable continue beta-blocker    Medications Discontinued During This Encounter   Medication Reason    calcium polycarbophil (FIBERCON) 625 mg tablet Not A Current Medication    synthetic conj estrogens a 0.3 mg tablet Not A Current Medication    omeprazole (PRILOSEC) 40 mg capsule Not A Current Medication    fexofenadine (ALLEGRA) 180 mg tablet Not A Current Medication       No orders of the defined types were placed in this encounter. Follow-up Disposition:  Return in about 1 year (around 3/8/2020).

## 2019-05-01 RX ORDER — METOPROLOL SUCCINATE 25 MG/1
TABLET, EXTENDED RELEASE ORAL
Qty: 90 TAB | Refills: 1 | Status: SHIPPED | OUTPATIENT
Start: 2019-05-01 | End: 2019-11-04 | Stop reason: SDUPTHER

## 2019-07-01 ENCOUNTER — OFFICE VISIT (OUTPATIENT)
Dept: CARDIOLOGY CLINIC | Age: 64
End: 2019-07-01

## 2019-07-01 VITALS
WEIGHT: 171 LBS | SYSTOLIC BLOOD PRESSURE: 120 MMHG | BODY MASS INDEX: 27.48 KG/M2 | HEART RATE: 71 BPM | DIASTOLIC BLOOD PRESSURE: 60 MMHG | HEIGHT: 66 IN

## 2019-07-01 DIAGNOSIS — R55 VASOVAGAL SYNCOPE: ICD-10-CM

## 2019-07-01 DIAGNOSIS — R00.2 PALPITATIONS: Primary | ICD-10-CM

## 2019-07-01 DIAGNOSIS — R07.89 CHEST PRESSURE: ICD-10-CM

## 2019-07-01 DIAGNOSIS — I10 ESSENTIAL HYPERTENSION: ICD-10-CM

## 2019-07-01 DIAGNOSIS — R42 DIZZINESS: ICD-10-CM

## 2019-07-01 RX ORDER — SULFAMETHOXAZOLE AND TRIMETHOPRIM 800; 160 MG/1; MG/1
1 TABLET ORAL 2 TIMES DAILY
COMMUNITY
End: 2019-09-11

## 2019-07-01 NOTE — PROGRESS NOTES
1. Have you been to the ER, urgent care clinic since your last visit? Hospitalized since your last visit? No    2. Have you seen or consulted any other health care providers outside of the [address] [place] [address] since your last visit? Include any pap smears or colon screening.  Yes Where: PCP

## 2019-07-01 NOTE — PROGRESS NOTES
HISTORY OF PRESENT ILLNESS  Kiara Zamora is a 61 y.o. female. 7/2019  Recent increasing chest pressure, tightness, shortness of breath. Also has felt lightheaded. Labile blood pressure and heart rate at home. Recent increase in migraine headaches. Chest pressure on exertion after doing things. Improved with rest.  Denies radiation. Dizziness   The history is provided by the patient. This is a new problem. The problem occurs rarely. The problem has been rapidly improving. Associated symptoms include chest pain and shortness of breath. Pertinent negatives include no abdominal pain and no headaches. The symptoms are aggravated by exertion. Nothing relieves the symptoms. Shortness of Breath   The history is provided by the patient. This is a recurrent problem. The problem occurs intermittently. The problem has been rapidly improving. Associated symptoms include chest pain. Pertinent negatives include no fever, no headaches, no cough, no sputum production, no hemoptysis, no wheezing, no PND, no orthopnea, no vomiting, no abdominal pain, no rash, no leg swelling and no claudication. The problem's precipitants include exercise. Palpitations    The history is provided by the patient. This is a new problem. The problem has been gradually worsening. The problem occurs every several days. The problem is associated with nothing. Associated symptoms include chest pain, dizziness and shortness of breath. Pertinent negatives include no fever, no claudication, no orthopnea, no PND, no abdominal pain, no nausea, no vomiting, no headaches, no weakness, no cough, no hemoptysis and no sputum production. Chest Pain (Angina)    The history is provided by the patient. This is a new problem. The current episode started more than 1 week ago. The problem has been gradually worsening. The problem occurs every several days. The pain is associated with exertion. The pain is present in the substernal region. The pain is mild. The quality of the pain is described as pressure-like. The pain does not radiate. Associated symptoms include dizziness, palpitations and shortness of breath. Pertinent negatives include no abdominal pain, no claudication, no cough, no fever, no headaches, no hemoptysis, no nausea, no orthopnea, no PND, no sputum production, no vomiting and no weakness. Review of Systems   Constitutional: Negative for chills and fever. HENT: Negative for nosebleeds. Eyes: Negative for blurred vision and double vision. Respiratory: Positive for shortness of breath. Negative for cough, hemoptysis, sputum production and wheezing. Cardiovascular: Positive for chest pain and palpitations. Negative for orthopnea, claudication, leg swelling and PND. Gastrointestinal: Negative for abdominal pain, heartburn, nausea and vomiting. Musculoskeletal: Negative for myalgias. Skin: Negative for rash. Neurological: Positive for dizziness. Negative for weakness and headaches. Endo/Heme/Allergies: Does not bruise/bleed easily. Family History   Problem Relation Age of Onset    Heart Disease Father         a fib ablation/cva    Heart Attack Father     Stroke Father     Cancer Maternal Grandfather 61        colon    Breast Cancer Mother        Past Medical History:   Diagnosis Date    Essential hypertension     Valvular heart disease     mvp       Past Surgical History:   Procedure Laterality Date    HX CATARACT REMOVAL      HX HYSTERECTOMY  2004    HX OOPHORECTOMY  2004    HX SHOULDER ARTHROSCOPY         Allergies   Allergen Reactions    Epinephrine Shortness of Breath       Current Outpatient Medications   Medication Sig    TURMERIC PO Take  by mouth.  trimethoprim-sulfamethoxazole (BACTRIM DS, SEPTRA DS) 160-800 mg per tablet Take 1 Tab by mouth two (2) times a day.     metoprolol succinate (TOPROL-XL) 25 mg XL tablet TAKE 1 TABLET BY MOUTH DAILY    ranitidine HCl (ACID CONTROL, RANITIDINE, PO) Take  by mouth.    cetirizine (ZYRTEC) 10 mg tablet Take  by mouth.  calcium carbonate (TUMS) 200 mg calcium (500 mg) chew Take 1 Tab by mouth daily.  calcium carbonate (CALTREX) 600 mg calcium (1,500 mg) tablet Take 600 mg by mouth two (2) times a day.  rizatriptan (MAXALT) 5 mg tablet Take 5 mg by mouth once as needed for Migraine. May repeat in 2 hours if needed    estradiol (VAGIFEM) 10 mcg tab vaginal tablet Insert 10 mcg into vagina daily.  aspirin 81 mg tablet Take 81 mg by mouth daily.  multivitamin (ONE A DAY) tablet Take 1 Tab by mouth daily.  magnesium 250 mg Tab Take  by mouth daily.  calcium 100 mg cap Take  by mouth daily. No current facility-administered medications for this visit. Visit Vitals  /60 (BP 1 Location: Right arm, BP Patient Position: Sitting)   Pulse 71   Ht 5' 6\" (1.676 m)   Wt 77.6 kg (171 lb)   BMI 27.60 kg/m²           Physical Exam   Constitutional: She is oriented to person, place, and time. She appears well-developed and well-nourished. HENT:   Head: Normocephalic and atraumatic. Eyes: Conjunctivae are normal.   Neck: Neck supple. No JVD present. No tracheal deviation present. No thyromegaly present. Cardiovascular: Normal rate and regular rhythm. PMI is not displaced. Exam reveals no gallop, no S3 and no decreased pulses. No murmur heard. Pulmonary/Chest: No respiratory distress. She has no wheezes. She has no rales. She exhibits no tenderness. Abdominal: Soft. There is no tenderness. Musculoskeletal: She exhibits no edema. Neurological: She is alert and oriented to person, place, and time. Skin: Skin is warm. Psychiatric: She has a normal mood and affect. No flowsheet data found. 7/2017-stress echo  Impressions: Normal study after maximal exercise. Echo impressions: There was no diagnostic evidence for stress-induced  ischemia.   SUMMARY:echo-7/2017  Left ventricle: Systolic function was normal. Ejection fraction was  estimated in the range of 55 % to 60 %. There were no regional wall motion  abnormalities. Doppler parameters were consistent with abnormal left  ventricular relaxation (grade 1 diastolic dysfunction). Right ventricle: The ventricle was mildly dilated. Mitral valve: There was mild annular calcification. There was mild  regurgitation. Tricuspid valve: There was mild regurgitation. Pulmonic valve: There was mild regurgitation. Prasad Yang MD   Cardiology 7/2017  Pre-procedure Diagnoses:   1. Dizziness [R42]   Post-procedure Diagnoses:   1. Vasovagal near syncope [R55]   Procedures:   1. TILT TABLE EVALUATION [[id number]]      []Hide copied text  []Hover for attribution information  Tilt table performed  Patient was in upright position for about 5 min after hydration  Her bp quickly dropped from 919'O systolic to 47'I systolic,patient was cold clammy -put in supine position with resolution of symptoms  No significant change in heart rate  Imp  Positive tilt table test  Vasovagal-vaso depressive responce            Assessment       ICD-10-CM ICD-9-CM    1. Palpitations R00.2 785.1 ECHO STRESS      CARDIAC EVENT MONITOR    Recently worse. Periodic monitor   2. Chest pressure R07.89 786.59 ECHO STRESS      CARDIAC EVENT MONITOR    Exertional chest pressure possible angina. Continue to monitor set up for stress echo   3. Vasovagal syncope R55 780.2     Stable Dizziness at times continue monitoring   4. Essential hypertension I10 401.9     Labile blood pressure monitor   5. Dizziness R42 780.4     Episodes on and off associated with palpitation     On low dose beta blockers for vasovagal syncope  If needed add florinef    2/2018  symptoms controlled with beta blockers    3/2019  Cardiac status stable continue beta-blocker  7/2019  Recent worsening symptom of palpitation chest tightness dizziness. Will set up for further testing  There are no discontinued medications.     No orders of the defined types were placed in this encounter. Follow-up and Dispositions    · Return for F/u after tests.

## 2019-07-05 ENCOUNTER — HOSPITAL ENCOUNTER (OUTPATIENT)
Dept: ULTRASOUND IMAGING | Age: 64
Discharge: HOME | End: 2019-07-05
Attending: NURSE PRACTITIONER
Payer: COMMERCIAL

## 2019-07-05 DIAGNOSIS — N39.0 URINARY TRACT INFECTION, SITE NOT SPECIFIED: ICD-10-CM

## 2019-07-05 PROCEDURE — 76770 US EXAM ABDO BACK WALL COMP: CPT

## 2019-08-05 DIAGNOSIS — R00.2 PALPITATIONS: ICD-10-CM

## 2019-08-05 DIAGNOSIS — R07.89 CHEST PRESSURE: ICD-10-CM

## 2019-09-11 ENCOUNTER — OFFICE VISIT (OUTPATIENT)
Dept: CARDIOLOGY CLINIC | Age: 64
End: 2019-09-11

## 2019-09-11 VITALS
HEART RATE: 77 BPM | SYSTOLIC BLOOD PRESSURE: 136 MMHG | BODY MASS INDEX: 28.03 KG/M2 | HEIGHT: 66 IN | WEIGHT: 174.4 LBS | DIASTOLIC BLOOD PRESSURE: 66 MMHG

## 2019-09-11 DIAGNOSIS — I10 ESSENTIAL HYPERTENSION: ICD-10-CM

## 2019-09-11 DIAGNOSIS — R07.89 CHEST PRESSURE: ICD-10-CM

## 2019-09-11 DIAGNOSIS — R00.2 PALPITATIONS: ICD-10-CM

## 2019-09-11 DIAGNOSIS — R55 VASOVAGAL SYNCOPE: Primary | ICD-10-CM

## 2019-09-11 NOTE — PROGRESS NOTES
1. Have you been to the ER, urgent care clinic since your last visit? Hospitalized since your last visit? Yes When: 08/2019 Where: [facility] Reason for visit: Burst Blood Vessel in Eye    2. Have you seen or consulted any other health care providers outside of the [address] [place] [address] since your last visit? Include any pap smears or colon screening.  Yes Where: Opthalmology Reason for visit: ER Follow Up

## 2019-09-11 NOTE — PROGRESS NOTES
HISTORY OF PRESENT ILLNESS  Ramez Pablo is a 61 y.o. female. 7/2019  Recent increasing chest pressure, tightness, shortness of breath. Also has felt lightheaded. Labile blood pressure and heart rate at home. Recent increase in migraine headaches. Chest pressure on exertion after doing things. Improved with rest.  Denies radiation. Dizziness   The history is provided by the patient. This is a new problem. The problem occurs rarely. The problem has been rapidly improving. Associated symptoms include chest pain and shortness of breath. Pertinent negatives include no abdominal pain and no headaches. The symptoms are aggravated by exertion. Nothing relieves the symptoms. Shortness of Breath   The history is provided by the patient. This is a recurrent problem. The problem occurs intermittently. The problem has been rapidly improving. Associated symptoms include chest pain. Pertinent negatives include no fever, no headaches, no cough, no sputum production, no hemoptysis, no wheezing, no PND, no orthopnea, no vomiting, no abdominal pain, no rash, no leg swelling and no claudication. The problem's precipitants include exercise. Palpitations    The history is provided by the patient. This is a new problem. The problem has been gradually worsening. The problem occurs every several days. The problem is associated with nothing. Associated symptoms include chest pain, dizziness and shortness of breath. Pertinent negatives include no fever, no claudication, no orthopnea, no PND, no abdominal pain, no nausea, no vomiting, no headaches, no weakness, no cough, no hemoptysis and no sputum production. Chest Pain (Angina)    The history is provided by the patient. This is a new problem. The current episode started more than 1 week ago. The problem has been gradually worsening. The problem occurs every several days. The pain is associated with exertion. The pain is present in the substernal region. The pain is mild. The quality of the pain is described as pressure-like. The pain does not radiate. Associated symptoms include dizziness, palpitations and shortness of breath. Pertinent negatives include no abdominal pain, no claudication, no cough, no fever, no headaches, no hemoptysis, no nausea, no orthopnea, no PND, no sputum production, no vomiting and no weakness. Review of Systems   Constitutional: Negative for chills and fever. HENT: Negative for nosebleeds. Eyes: Negative for blurred vision and double vision. Respiratory: Positive for shortness of breath. Negative for cough, hemoptysis, sputum production and wheezing. Cardiovascular: Positive for chest pain and palpitations. Negative for orthopnea, claudication, leg swelling and PND. Gastrointestinal: Negative for abdominal pain, heartburn, nausea and vomiting. Musculoskeletal: Negative for myalgias. Skin: Negative for rash. Neurological: Positive for dizziness. Negative for weakness and headaches. Endo/Heme/Allergies: Does not bruise/bleed easily. Family History   Problem Relation Age of Onset    Heart Disease Father         a fib ablation/cva    Heart Attack Father     Stroke Father     Cancer Maternal Grandfather 61        colon    Breast Cancer Mother        Past Medical History:   Diagnosis Date    Essential hypertension     Valvular heart disease     mvp       Past Surgical History:   Procedure Laterality Date    HX CATARACT REMOVAL      HX HYSTERECTOMY  2004    HX OOPHORECTOMY  2004    HX SHOULDER ARTHROSCOPY         Allergies   Allergen Reactions    Epinephrine Shortness of Breath       Current Outpatient Medications   Medication Sig    DOXYCYCLINE HYCLATE PO Take  by mouth.  TURMERIC PO Take  by mouth.  metoprolol succinate (TOPROL-XL) 25 mg XL tablet TAKE 1 TABLET BY MOUTH DAILY    ranitidine HCl (ACID CONTROL, RANITIDINE, PO) Take  by mouth.  cetirizine (ZYRTEC) 10 mg tablet Take  by mouth.     calcium carbonate (TUMS) 200 mg calcium (500 mg) chew Take 1 Tab by mouth daily.  calcium carbonate (CALTREX) 600 mg calcium (1,500 mg) tablet Take 600 mg by mouth two (2) times a day.  rizatriptan (MAXALT) 5 mg tablet Take 5 mg by mouth once as needed for Migraine. May repeat in 2 hours if needed    estradiol (VAGIFEM) 10 mcg tab vaginal tablet Insert 10 mcg into vagina daily.  aspirin 81 mg tablet Take 81 mg by mouth daily.  multivitamin (ONE A DAY) tablet Take 1 Tab by mouth daily.  magnesium 250 mg Tab Take  by mouth daily.  calcium 100 mg cap Take  by mouth daily. No current facility-administered medications for this visit. Visit Vitals  /66   Pulse 77   Ht 5' 6\" (1.676 m)   Wt 79.1 kg (174 lb 6.4 oz)   BMI 28.15 kg/m²           Physical Exam   Constitutional: She is oriented to person, place, and time. She appears well-developed and well-nourished. HENT:   Head: Normocephalic and atraumatic. Eyes: Conjunctivae are normal.   Neck: Neck supple. No JVD present. No tracheal deviation present. No thyromegaly present. Cardiovascular: Normal rate and regular rhythm. PMI is not displaced. Exam reveals no gallop, no S3 and no decreased pulses. No murmur heard. Pulmonary/Chest: No respiratory distress. She has no wheezes. She has no rales. She exhibits no tenderness. Abdominal: Soft. There is no tenderness. Musculoskeletal: She exhibits no edema. Neurological: She is alert and oriented to person, place, and time. Skin: Skin is warm. Psychiatric: She has a normal mood and affect. No flowsheet data found. 7/2017-stress echo  Impressions: Normal study after maximal exercise. Echo impressions: There was no diagnostic evidence for stress-induced  ischemia. SUMMARY:echo-7/2017  Left ventricle: Systolic function was normal. Ejection fraction was  estimated in the range of 55 % to 60 %. There were no regional wall motion  abnormalities.  Doppler parameters were consistent with abnormal left  ventricular relaxation (grade 1 diastolic dysfunction). Right ventricle: The ventricle was mildly dilated. Mitral valve: There was mild annular calcification. There was mild  regurgitation. Tricuspid valve: There was mild regurgitation. Pulmonic valve: There was mild regurgitation. Jayleen Arrington MD   Cardiology 7/2017  Pre-procedure Diagnoses:   1. Dizziness [R42]   Post-procedure Diagnoses:   1. Vasovagal near syncope [R55]   Procedures:   1. TILT TABLE EVALUATION [[id number]]      []Hide copied text  []Hover for attribution information  Tilt table performed  Patient was in upright position for about 5 min after hydration  Her bp quickly dropped from 554'F systolic to 31'X systolic,patient was cold clammy -put in supine position with resolution of symptoms  No significant change in heart rate  Imp  Positive tilt table test  Vasovagal-vaso depressive responce        Interpretation Summary stress echo 9/2019    · Baseline ECG: Normal sinus rhythm. Poor R wave progression. Cannot rule out anterior infarct, age undetermined. · Negative stress test.  · Normal stress echocardiogram. Low risk study. 8/2019  Event monitor  Sinus rhythm, sinus bradycardia into 40s. PVC. No significant arrhythmia        Assessment       ICD-10-CM ICD-9-CM    1. Vasovagal syncope R55 780.2     Stable no recurrence continue monitoring   2. Chest pressure R07.89 786.59     Negative stress echo monitor clinically   3. Essential hypertension I10 401.9     Stable on treatment   4. Palpitations R00.2 785.1     No significant arrhythmia. Rare PVC on monitor     On low dose beta blockers for vasovagal syncope  If needed add florinef    2/2018  symptoms controlled with beta blockers    3/2019  Cardiac status stable continue beta-blocker  7/2019  Recent worsening symptom of palpitation chest tightness dizziness. Will set up for further testing  9/2019  Cardiac status stable.   Palpitations slowly improving continue current therapy negative stress echo. Rare PVC on event monitor  Medications Discontinued During This Encounter   Medication Reason    trimethoprim-sulfamethoxazole (BACTRIM DS, SEPTRA DS) 160-800 mg per tablet Not A Current Medication       No orders of the defined types were placed in this encounter. Follow-up and Dispositions    · Return in about 6 months (around 3/11/2020).

## 2019-11-04 RX ORDER — METOPROLOL SUCCINATE 25 MG/1
TABLET, EXTENDED RELEASE ORAL
Qty: 90 TAB | Refills: 0 | Status: SHIPPED | OUTPATIENT
Start: 2019-11-04 | End: 2020-01-31

## 2020-01-31 RX ORDER — METOPROLOL SUCCINATE 25 MG/1
TABLET, EXTENDED RELEASE ORAL
Qty: 90 TAB | Refills: 0 | Status: SHIPPED | OUTPATIENT
Start: 2020-01-31 | End: 2020-04-28

## 2020-03-05 ENCOUNTER — HOSPITAL ENCOUNTER (OUTPATIENT)
Dept: MAMMOGRAPHY | Age: 65
Discharge: HOME | End: 2020-03-05
Attending: INTERNAL MEDICINE
Payer: COMMERCIAL

## 2020-03-05 DIAGNOSIS — Z12.31 VISIT FOR SCREENING MAMMOGRAM: ICD-10-CM

## 2020-03-05 PROCEDURE — 77063 BREAST TOMOSYNTHESIS BI: CPT

## 2020-03-06 ENCOUNTER — OFFICE VISIT (OUTPATIENT)
Dept: CARDIOLOGY CLINIC | Age: 65
End: 2020-03-06

## 2020-03-06 VITALS
WEIGHT: 169.8 LBS | HEART RATE: 67 BPM | HEIGHT: 66 IN | BODY MASS INDEX: 27.29 KG/M2 | TEMPERATURE: 98.1 F | SYSTOLIC BLOOD PRESSURE: 129 MMHG | DIASTOLIC BLOOD PRESSURE: 59 MMHG | OXYGEN SATURATION: 94 %

## 2020-03-06 DIAGNOSIS — I10 ESSENTIAL HYPERTENSION: ICD-10-CM

## 2020-03-06 DIAGNOSIS — R55 VASOVAGAL SYNCOPE: Primary | ICD-10-CM

## 2020-03-06 DIAGNOSIS — R00.2 PALPITATIONS: ICD-10-CM

## 2020-03-06 NOTE — PROGRESS NOTES
Patient didn't bring medications, verbally reviewed. 1. Have you been to the ER, urgent care clinic since your last visit? Hospitalized since your last visit? No    2. Have you seen or consulted any other health care providers outside of the [address] [place] [address] since your last visit? Include any pap smears or colon screening.  Yes Where: Dermatology Reason for visit: Eczema

## 2020-04-28 RX ORDER — METOPROLOL SUCCINATE 25 MG/1
TABLET, EXTENDED RELEASE ORAL
Qty: 90 TAB | Refills: 0 | Status: SHIPPED | OUTPATIENT
Start: 2020-04-28 | End: 2020-07-27

## 2020-07-27 RX ORDER — METOPROLOL SUCCINATE 25 MG/1
TABLET, EXTENDED RELEASE ORAL
Qty: 90 TAB | Refills: 0 | Status: SHIPPED | OUTPATIENT
Start: 2020-07-27 | End: 2020-10-23

## 2020-10-23 ENCOUNTER — TRANSCRIBE ORDER (OUTPATIENT)
Dept: SCHEDULING | Age: 65
End: 2020-10-23

## 2020-10-23 DIAGNOSIS — R09.82 POSTNASAL DRIP: ICD-10-CM

## 2020-10-23 DIAGNOSIS — J32.9 CHRONIC INFECTION OF SINUS: Primary | ICD-10-CM

## 2020-10-23 RX ORDER — METOPROLOL SUCCINATE 25 MG/1
TABLET, EXTENDED RELEASE ORAL
Qty: 90 TAB | Refills: 0 | Status: SHIPPED | OUTPATIENT
Start: 2020-10-23 | End: 2021-01-21

## 2020-11-10 ENCOUNTER — HOSPITAL ENCOUNTER (OUTPATIENT)
Dept: CT IMAGING | Age: 65
Discharge: HOME | End: 2020-11-10
Attending: OTOLARYNGOLOGY
Payer: COMMERCIAL

## 2020-11-10 DIAGNOSIS — R09.82 POSTNASAL DRIP: ICD-10-CM

## 2020-11-10 DIAGNOSIS — J32.9 CHRONIC INFECTION OF SINUS: ICD-10-CM

## 2020-11-10 PROCEDURE — 70486 CT MAXILLOFACIAL W/O DYE: CPT

## 2021-01-21 RX ORDER — METOPROLOL SUCCINATE 25 MG/1
TABLET, EXTENDED RELEASE ORAL
Qty: 90 TAB | Refills: 0 | Status: SHIPPED | OUTPATIENT
Start: 2021-01-21 | End: 2021-04-15 | Stop reason: SDUPTHER

## 2021-02-15 ENCOUNTER — TRANSCRIBE ORDER (OUTPATIENT)
Dept: SCHEDULING | Age: 66
End: 2021-02-15

## 2021-02-15 DIAGNOSIS — Z12.31 VISIT FOR SCREENING MAMMOGRAM: Primary | ICD-10-CM

## 2021-03-25 ENCOUNTER — TRANSCRIBE ORDER (OUTPATIENT)
Dept: SCHEDULING | Age: 66
End: 2021-03-25

## 2021-03-25 DIAGNOSIS — M54.16 LUMBAR RADICULOPATHY: Primary | ICD-10-CM

## 2021-04-15 ENCOUNTER — OFFICE VISIT (OUTPATIENT)
Dept: CARDIOLOGY CLINIC | Age: 66
End: 2021-04-15
Payer: MEDICARE

## 2021-04-15 VITALS
SYSTOLIC BLOOD PRESSURE: 141 MMHG | DIASTOLIC BLOOD PRESSURE: 83 MMHG | OXYGEN SATURATION: 97 % | HEIGHT: 66 IN | TEMPERATURE: 97.9 F | WEIGHT: 173.6 LBS | HEART RATE: 62 BPM | BODY MASS INDEX: 27.9 KG/M2

## 2021-04-15 DIAGNOSIS — I10 ESSENTIAL HYPERTENSION: ICD-10-CM

## 2021-04-15 DIAGNOSIS — R07.89 CHEST PRESSURE: ICD-10-CM

## 2021-04-15 DIAGNOSIS — R55 VASOVAGAL SYNCOPE: Primary | ICD-10-CM

## 2021-04-15 DIAGNOSIS — I49.3 PVC (PREMATURE VENTRICULAR CONTRACTION): ICD-10-CM

## 2021-04-15 DIAGNOSIS — R00.2 PALPITATIONS: ICD-10-CM

## 2021-04-15 PROCEDURE — G8419 CALC BMI OUT NRM PARAM NOF/U: HCPCS | Performed by: INTERNAL MEDICINE

## 2021-04-15 PROCEDURE — 99214 OFFICE O/P EST MOD 30 MIN: CPT | Performed by: INTERNAL MEDICINE

## 2021-04-15 PROCEDURE — G8400 PT W/DXA NO RESULTS DOC: HCPCS | Performed by: INTERNAL MEDICINE

## 2021-04-15 PROCEDURE — 1101F PT FALLS ASSESS-DOCD LE1/YR: CPT | Performed by: INTERNAL MEDICINE

## 2021-04-15 PROCEDURE — G9899 SCRN MAM PERF RSLTS DOC: HCPCS | Performed by: INTERNAL MEDICINE

## 2021-04-15 PROCEDURE — G8753 SYS BP > OR = 140: HCPCS | Performed by: INTERNAL MEDICINE

## 2021-04-15 PROCEDURE — 3017F COLORECTAL CA SCREEN DOC REV: CPT | Performed by: INTERNAL MEDICINE

## 2021-04-15 PROCEDURE — G8536 NO DOC ELDER MAL SCRN: HCPCS | Performed by: INTERNAL MEDICINE

## 2021-04-15 PROCEDURE — 1090F PRES/ABSN URINE INCON ASSESS: CPT | Performed by: INTERNAL MEDICINE

## 2021-04-15 PROCEDURE — G8510 SCR DEP NEG, NO PLAN REQD: HCPCS | Performed by: INTERNAL MEDICINE

## 2021-04-15 PROCEDURE — G8754 DIAS BP LESS 90: HCPCS | Performed by: INTERNAL MEDICINE

## 2021-04-15 PROCEDURE — G8427 DOCREV CUR MEDS BY ELIG CLIN: HCPCS | Performed by: INTERNAL MEDICINE

## 2021-04-15 RX ORDER — METOPROLOL SUCCINATE 25 MG/1
25 TABLET, EXTENDED RELEASE ORAL DAILY
Qty: 90 TAB | Refills: 3 | Status: SHIPPED | OUTPATIENT
Start: 2021-04-15 | End: 2021-04-20

## 2021-04-15 NOTE — PROGRESS NOTES
HISTORY OF PRESENT ILLNESS  Gladys Garrido is a 72 y.o. female. 7/2019  Recent increasing chest pressure, tightness, shortness of breath. Also has felt lightheaded. Labile blood pressure and heart rate at home. Recent increase in migraine headaches. Chest pressure on exertion after doing things. Improved with rest.  Denies radiation. Dizziness  The history is provided by the patient. This is a new problem. The problem occurs rarely. The problem has been rapidly improving. Associated symptoms include chest pain and shortness of breath. Pertinent negatives include no abdominal pain and no headaches. The symptoms are aggravated by exertion. Nothing relieves the symptoms. Shortness of Breath  The history is provided by the patient. This is a recurrent problem. The problem occurs intermittently. The problem has been rapidly improving. Associated symptoms include chest pain. Pertinent negatives include no fever, no headaches, no cough, no sputum production, no hemoptysis, no wheezing, no PND, no orthopnea, no vomiting, no abdominal pain, no rash, no leg swelling and no claudication. The problem's precipitants include exercise. Palpitations   The history is provided by the patient. This is a new problem. The problem has been gradually worsening. The problem occurs every several days. The problem is associated with nothing. Associated symptoms include chest pain, dizziness and shortness of breath. Pertinent negatives include no fever, no claudication, no orthopnea, no PND, no abdominal pain, no nausea, no vomiting, no headaches, no weakness, no cough, no hemoptysis and no sputum production. Chest Pain (Angina)   The history is provided by the patient. This is a new problem. The current episode started more than 1 week ago. The problem has been gradually worsening. The problem occurs every several days. The pain is associated with exertion. The pain is present in the substernal region. The pain is mild.  The quality of the pain is described as pressure-like. The pain does not radiate. Associated symptoms include dizziness, palpitations and shortness of breath. Pertinent negatives include no abdominal pain, no claudication, no cough, no fever, no headaches, no hemoptysis, no nausea, no orthopnea, no PND, no sputum production, no vomiting and no weakness. Follow-up  Associated symptoms include chest pain and shortness of breath. Pertinent negatives include no abdominal pain and no headaches. Review of Systems   Constitutional: Negative for chills and fever. HENT: Negative for nosebleeds. Eyes: Negative for blurred vision and double vision. Respiratory: Positive for shortness of breath. Negative for cough, hemoptysis, sputum production and wheezing. Cardiovascular: Positive for chest pain and palpitations. Negative for orthopnea, claudication, leg swelling and PND. Gastrointestinal: Negative for abdominal pain, heartburn, nausea and vomiting. Musculoskeletal: Negative for myalgias. Skin: Negative for rash. Neurological: Positive for dizziness. Negative for weakness and headaches. Endo/Heme/Allergies: Does not bruise/bleed easily. Family History   Problem Relation Age of Onset    Heart Disease Father         a fib ablation/cva    Heart Attack Father     Stroke Father     Cancer Maternal Grandfather 61        colon    Breast Cancer Mother        Past Medical History:   Diagnosis Date    Essential hypertension     Valvular heart disease     mvp       Past Surgical History:   Procedure Laterality Date    HX CATARACT REMOVAL      HX HYSTERECTOMY  2004    HX OOPHORECTOMY  2004    HX SHOULDER ARTHROSCOPY         Allergies   Allergen Reactions    Epinephrine Shortness of Breath       Current Outpatient Medications   Medication Sig    famotidine/Ca carb/mag hydrox (PEPCID COMPLETE PO) Take  by mouth.  metoprolol succinate (TOPROL-XL) 25 mg XL tablet Take 1 Tab by mouth daily.     guaifenesin (MUCINEX PO) Take  by mouth.  TURMERIC PO Take  by mouth.  cetirizine (ZYRTEC) 10 mg tablet Take  by mouth.  rizatriptan (MAXALT) 5 mg tablet Take 5 mg by mouth once as needed for Migraine. May repeat in 2 hours if needed    multivitamin (ONE A DAY) tablet Take 1 Tab by mouth daily.  magnesium 250 mg Tab Take  by mouth daily. No current facility-administered medications for this visit. Visit Vitals  BP (!) 141/83 (BP 1 Location: Left arm, BP Patient Position: Sitting, BP Cuff Size: Adult)   Pulse 62   Temp 97.9 °F (36.6 °C) (Temporal)   Ht 5' 6\" (1.676 m)   Wt 78.7 kg (173 lb 9.6 oz)   SpO2 97%   BMI 28.02 kg/m²           Physical Exam   Constitutional: She is oriented to person, place, and time. She appears well-developed and well-nourished. HENT:   Head: Normocephalic and atraumatic. Eyes: Conjunctivae are normal.   Neck: Neck supple. No JVD present. No tracheal deviation present. No thyromegaly present. Cardiovascular: Normal rate and regular rhythm. PMI is not displaced. Exam reveals no gallop, no S3 and no decreased pulses. No murmur heard. Pulmonary/Chest: No respiratory distress. She has no wheezes. She has no rales. She exhibits no tenderness. Abdominal: Soft. There is no abdominal tenderness. Musculoskeletal:         General: No edema. Neurological: She is alert and oriented to person, place, and time. Skin: Skin is warm. Psychiatric: She has a normal mood and affect. No flowsheet data found. 7/2017-stress echo  Impressions: Normal study after maximal exercise. Echo impressions: There was no diagnostic evidence for stress-induced  ischemia. SUMMARY:echo-7/2017  Left ventricle: Systolic function was normal. Ejection fraction was  estimated in the range of 55 % to 60 %. There were no regional wall motion  abnormalities. Doppler parameters were consistent with abnormal left  ventricular relaxation (grade 1 diastolic dysfunction).     Right ventricle: The ventricle was mildly dilated. Mitral valve: There was mild annular calcification. There was mild  regurgitation. Tricuspid valve: There was mild regurgitation. Pulmonic valve: There was mild regurgitation. Gia Zavala MD   Cardiology 7/2017  Pre-procedure Diagnoses:   1. Dizziness [R42]   Post-procedure Diagnoses:   1. Vasovagal near syncope [R55]   Procedures:   1. TILT TABLE EVALUATION [[id number]]      []Hide copied text  []Hover for attribution information  Tilt table performed  Patient was in upright position for about 5 min after hydration  Her bp quickly dropped from 102'I systolic to 89'Q systolic,patient was cold clammy -put in supine position with resolution of symptoms  No significant change in heart rate  Imp  Positive tilt table test  Vasovagal-vaso depressive responce        Interpretation Summary stress echo 9/2019    · Baseline ECG: Normal sinus rhythm. Poor R wave progression. Cannot rule out anterior infarct, age undetermined. · Negative stress test.  · Normal stress echocardiogram. Low risk study. 8/2019  Event monitor  Sinus rhythm, sinus bradycardia into 40s. PVC. No significant arrhythmia        Assessment       ICD-10-CM ICD-9-CM    1. Vasovagal syncope  R55 780.2     Stable continue treatment monitor   2. Essential hypertension  I10 401.9     Stable monitor   3. Palpitations  R00.2 785.1     Stable symptom monitor   4. Chest pressure  R07.89 786.59     Stable. Negative stress echo in past   5. PVC (premature ventricular contraction)  I49.3 427.69     Stable continue beta-blocker     On low dose beta blockers for vasovagal syncope  If needed add florinef    2/2018  symptoms controlled with beta blockers    3/2019  Cardiac status stable continue beta-blocker  7/2019  Recent worsening symptom of palpitation chest tightness dizziness. Will set up for further testing  9/2019  Cardiac status stable.   Palpitations slowly improving continue current therapy, negative stress echo. Rare PVC on event monitor  4/2021  Cardiac status stable continue current medical management. No recurrent syncope. Chest pressure and palpitations stable. Occasional dizziness continue with pressure maneuvers and hydration. Beta-blocker as planned  Medications Discontinued During This Encounter   Medication Reason    ranitidine HCl (ACID CONTROL, RANITIDINE, PO) Not A Current Medication    calcium carbonate (TUMS) 200 mg calcium (500 mg) chew Not A Current Medication    estradiol (VAGIFEM) 10 mcg tab vaginal tablet Not A Current Medication    aspirin 81 mg tablet Not A Current Medication    metoprolol succinate (TOPROL-XL) 25 mg XL tablet REORDER       Orders Placed This Encounter    metoprolol succinate (TOPROL-XL) 25 mg XL tablet     Sig: Take 1 Tab by mouth daily. Dispense:  90 Tab     Refill:  3       Follow-up and Dispositions    · Return in about 1 year (around 4/15/2022).

## 2021-04-15 NOTE — PROGRESS NOTES
1. Have you been to the ER, urgent care clinic since your last visit? Hospitalized since your last visit? No    2. Have you seen or consulted any other health care providers outside of the [address] [place] [address] since your last visit? Include any pap smears or colon screening.  Yes Where: PCP/Dermatology/Orthopedic/Gastroenterology

## 2021-04-20 RX ORDER — METOPROLOL SUCCINATE 25 MG/1
TABLET, EXTENDED RELEASE ORAL
Qty: 90 TAB | Refills: 3 | Status: SHIPPED | OUTPATIENT
Start: 2021-04-20 | End: 2021-11-16

## 2021-04-23 ENCOUNTER — HOSPITAL ENCOUNTER (OUTPATIENT)
Age: 66
Discharge: HOME | End: 2021-04-23
Attending: ORTHOPAEDIC SURGERY
Payer: MEDICARE

## 2021-04-23 DIAGNOSIS — M54.16 LUMBAR RADICULOPATHY: ICD-10-CM

## 2021-04-23 PROCEDURE — 72148 MRI LUMBAR SPINE W/O DYE: CPT

## 2021-05-14 ENCOUNTER — HOSPITAL ENCOUNTER (OUTPATIENT)
Dept: MAMMOGRAPHY | Age: 66
Discharge: HOME | End: 2021-05-14
Attending: INTERNAL MEDICINE
Payer: MEDICARE

## 2021-05-14 DIAGNOSIS — Z12.31 VISIT FOR SCREENING MAMMOGRAM: ICD-10-CM

## 2021-05-14 PROCEDURE — 77063 BREAST TOMOSYNTHESIS BI: CPT

## 2021-10-16 ASSESSMENT — VISUAL ACUITY
OS_CC: 20/25 -2 SN
OD_CC: 20/25 -2 SN

## 2021-10-16 ASSESSMENT — TONOMETRY
OS_IOP_MMHG: 17
OD_IOP_MMHG: 16

## 2021-11-16 ENCOUNTER — OFFICE VISIT (OUTPATIENT)
Dept: CARDIOLOGY CLINIC | Age: 66
End: 2021-11-16
Payer: MEDICARE

## 2021-11-16 VITALS
WEIGHT: 178 LBS | HEART RATE: 71 BPM | SYSTOLIC BLOOD PRESSURE: 143 MMHG | DIASTOLIC BLOOD PRESSURE: 78 MMHG | HEIGHT: 66 IN | BODY MASS INDEX: 28.61 KG/M2

## 2021-11-16 DIAGNOSIS — R55 VASOVAGAL SYNCOPE: Primary | ICD-10-CM

## 2021-11-16 DIAGNOSIS — I10 ESSENTIAL HYPERTENSION: ICD-10-CM

## 2021-11-16 DIAGNOSIS — I49.3 PVC (PREMATURE VENTRICULAR CONTRACTION): ICD-10-CM

## 2021-11-16 DIAGNOSIS — R00.2 PALPITATIONS: ICD-10-CM

## 2021-11-16 PROCEDURE — G8536 NO DOC ELDER MAL SCRN: HCPCS | Performed by: INTERNAL MEDICINE

## 2021-11-16 PROCEDURE — 1101F PT FALLS ASSESS-DOCD LE1/YR: CPT | Performed by: INTERNAL MEDICINE

## 2021-11-16 PROCEDURE — G9899 SCRN MAM PERF RSLTS DOC: HCPCS | Performed by: INTERNAL MEDICINE

## 2021-11-16 PROCEDURE — G8510 SCR DEP NEG, NO PLAN REQD: HCPCS | Performed by: INTERNAL MEDICINE

## 2021-11-16 PROCEDURE — 3017F COLORECTAL CA SCREEN DOC REV: CPT | Performed by: INTERNAL MEDICINE

## 2021-11-16 PROCEDURE — G8419 CALC BMI OUT NRM PARAM NOF/U: HCPCS | Performed by: INTERNAL MEDICINE

## 2021-11-16 PROCEDURE — G8427 DOCREV CUR MEDS BY ELIG CLIN: HCPCS | Performed by: INTERNAL MEDICINE

## 2021-11-16 PROCEDURE — 1090F PRES/ABSN URINE INCON ASSESS: CPT | Performed by: INTERNAL MEDICINE

## 2021-11-16 PROCEDURE — G8754 DIAS BP LESS 90: HCPCS | Performed by: INTERNAL MEDICINE

## 2021-11-16 PROCEDURE — G8400 PT W/DXA NO RESULTS DOC: HCPCS | Performed by: INTERNAL MEDICINE

## 2021-11-16 PROCEDURE — 99214 OFFICE O/P EST MOD 30 MIN: CPT | Performed by: INTERNAL MEDICINE

## 2021-11-16 PROCEDURE — G8753 SYS BP > OR = 140: HCPCS | Performed by: INTERNAL MEDICINE

## 2021-11-16 RX ORDER — METOPROLOL SUCCINATE 25 MG/1
12.5 TABLET, EXTENDED RELEASE ORAL DAILY
Qty: 90 TABLET | Refills: 3
Start: 2021-11-16 | End: 2022-05-13

## 2021-11-16 RX ORDER — AMLODIPINE BESYLATE 2.5 MG/1
2.5 TABLET ORAL DAILY
Qty: 90 TABLET | Refills: 3 | Status: SHIPPED | OUTPATIENT
Start: 2021-11-16

## 2021-11-16 NOTE — PROGRESS NOTES
HISTORY OF PRESENT ILLNESS  Echo Musa is a 72 y.o. female. 7/2019  Recent increasing chest pressure, tightness, shortness of breath. Also has felt lightheaded. Labile blood pressure and heart rate at home. Recent increase in migraine headaches. Chest pressure on exertion after doing things. Improved with rest.  Denies radiation. Follow-up  Associated symptoms include chest pain and shortness of breath. Pertinent negatives include no abdominal pain and no headaches. Dizziness  The history is provided by the patient. This is a new problem. The problem occurs rarely. The problem has been rapidly improving. Associated symptoms include chest pain and shortness of breath. Pertinent negatives include no abdominal pain and no headaches. The symptoms are aggravated by exertion. Nothing relieves the symptoms. Shortness of Breath  The history is provided by the patient. This is a recurrent problem. The problem occurs intermittently. The problem has been rapidly improving. Associated symptoms include chest pain. Pertinent negatives include no fever, no headaches, no cough, no sputum production, no hemoptysis, no wheezing, no PND, no orthopnea, no vomiting, no abdominal pain, no rash, no leg swelling and no claudication. The problem's precipitants include exercise. Palpitations   The history is provided by the patient. This is a new problem. The problem has been gradually worsening. The problem occurs every several days. The problem is associated with nothing. Associated symptoms include chest pain, dizziness and shortness of breath. Pertinent negatives include no fever, no claudication, no orthopnea, no PND, no abdominal pain, no nausea, no vomiting, no headaches, no weakness, no cough, no hemoptysis and no sputum production. Chest Pain (Angina)   The history is provided by the patient. This is a new problem. The current episode started more than 1 week ago. The problem has been gradually worsening.  The problem occurs every several days. The pain is associated with exertion. The pain is present in the substernal region. The pain is mild. The quality of the pain is described as pressure-like. The pain does not radiate. Associated symptoms include dizziness, palpitations and shortness of breath. Pertinent negatives include no abdominal pain, no claudication, no cough, no fever, no headaches, no hemoptysis, no nausea, no orthopnea, no PND, no sputum production, no vomiting and no weakness. Review of Systems   Constitutional: Negative for chills and fever. HENT: Negative for nosebleeds. Eyes: Negative for blurred vision and double vision. Respiratory: Positive for shortness of breath. Negative for cough, hemoptysis, sputum production and wheezing. Cardiovascular: Positive for chest pain and palpitations. Negative for orthopnea, claudication, leg swelling and PND. Gastrointestinal: Negative for abdominal pain, heartburn, nausea and vomiting. Musculoskeletal: Negative for myalgias. Skin: Negative for rash. Neurological: Positive for dizziness. Negative for weakness and headaches. Endo/Heme/Allergies: Does not bruise/bleed easily. Family History   Problem Relation Age of Onset    Heart Disease Father         a fib ablation/cva    Heart Attack Father     Stroke Father     Cancer Maternal Grandfather 61        colon    Breast Cancer Mother        Past Medical History:   Diagnosis Date    Essential hypertension     Valvular heart disease     mvp       Past Surgical History:   Procedure Laterality Date    HX CATARACT REMOVAL      HX HYSTERECTOMY  2004    HX OOPHORECTOMY  2004    HX SHOULDER ARTHROSCOPY         Allergies   Allergen Reactions    Epinephrine Shortness of Breath       Current Outpatient Medications   Medication Sig    metoprolol succinate (TOPROL-XL) 25 mg XL tablet Take 0.5 Tablets by mouth daily.  amLODIPine (NORVASC) 2.5 mg tablet Take 1 Tablet by mouth daily.     famotidine/Ca carb/mag hydrox (PEPCID COMPLETE PO) Take  by mouth.  guaifenesin (MUCINEX PO) Take  by mouth.  TURMERIC PO Take  by mouth.  cetirizine (ZYRTEC) 10 mg tablet Take  by mouth.  rizatriptan (MAXALT) 5 mg tablet Take 5 mg by mouth once as needed for Migraine. May repeat in 2 hours if needed    multivitamin (ONE A DAY) tablet Take 1 Tab by mouth daily.  magnesium 250 mg Tab Take  by mouth daily. No current facility-administered medications for this visit. Visit Vitals  BP (!) 143/78   Pulse 71   Ht 5' 6\" (1.676 m)   Wt 80.7 kg (178 lb)   BMI 28.73 kg/m²           Physical Exam  Constitutional:       Appearance: She is well-developed. HENT:      Head: Normocephalic and atraumatic. Eyes:      Conjunctiva/sclera: Conjunctivae normal.   Neck:      Thyroid: No thyromegaly. Vascular: No JVD. Trachea: No tracheal deviation. Cardiovascular:      Rate and Rhythm: Normal rate and regular rhythm. Chest Wall: PMI is not displaced. Pulses: No decreased pulses. Heart sounds: No murmur heard. No gallop. No S3 sounds. Pulmonary:      Effort: No respiratory distress. Breath sounds: No wheezing or rales. Chest:      Chest wall: No tenderness. Abdominal:      Palpations: Abdomen is soft. Tenderness: There is no abdominal tenderness. Musculoskeletal:      Cervical back: Neck supple. Skin:     General: Skin is warm. Neurological:      Mental Status: She is alert and oriented to person, place, and time. No flowsheet data found. 7/2017-stress echo  Impressions: Normal study after maximal exercise. Echo impressions: There was no diagnostic evidence for stress-induced  ischemia. SUMMARY:echo-7/2017  Left ventricle: Systolic function was normal. Ejection fraction was  estimated in the range of 55 % to 60 %. There were no regional wall motion  abnormalities.  Doppler parameters were consistent with abnormal left  ventricular relaxation (grade 1 diastolic dysfunction). Right ventricle: The ventricle was mildly dilated. Mitral valve: There was mild annular calcification. There was mild  regurgitation. Tricuspid valve: There was mild regurgitation. Pulmonic valve: There was mild regurgitation. Janine Benson MD   Cardiology 7/2017  Pre-procedure Diagnoses:   1. Dizziness [R42]   Post-procedure Diagnoses:   1. Vasovagal near syncope [R55]   Procedures:   1. TILT TABLE EVALUATION [[id number]]      []Hide copied text  []Hover for attribution information  Tilt table performed  Patient was in upright position for about 5 min after hydration  Her bp quickly dropped from 455'F systolic to 24'L systolic,patient was cold clammy -put in supine position with resolution of symptoms  No significant change in heart rate  Imp  Positive tilt table test  Vasovagal-vaso depressive responce        Interpretation Summary stress echo 9/2019    · Baseline ECG: Normal sinus rhythm. Poor R wave progression. Cannot rule out anterior infarct, age undetermined. · Negative stress test.  · Normal stress echocardiogram. Low risk study. 8/2019  Event monitor  Sinus rhythm, sinus bradycardia into 40s. PVC. No significant arrhythmia        Assessment       ICD-10-CM ICD-9-CM    1. Vasovagal syncope  R55 780.2     Stable no recurrence continue monitoring   2. PVC (premature ventricular contraction)  I49.3 427.69     Episode of palpitation. Also has bradycardia in 30s and 40s   3. Essential hypertension  I10 401.9     Mildly elevated adjust medication   4. Palpitations  R00.2 785.1     Occasional episode of palpitation and bradycardia     On low dose beta blockers for vasovagal syncope  If needed add florinef    2/2018  symptoms controlled with beta blockers    3/2019  Cardiac status stable continue beta-blocker  7/2019  Recent worsening symptom of palpitation chest tightness dizziness. Will set up for further testing  9/2019  Cardiac status stable.   Palpitations slowly improving continue current therapy, negative stress echo. Rare PVC on event monitor  4/2021  Cardiac status stable continue current medical management. No recurrent syncope. Chest pressure and palpitations stable. Occasional dizziness continue with pressure maneuvers and hydration. Beta-blocker as planned  11/2021  Recent increasing palpitation and lightheadedness. Will reduce beta-blocker is better. Of bradycardia in 30s and 40s. Add amlodipine low-dose for blood pressure. If symptoms persist after adjustment of medication consider repeat event monitor  Medications Discontinued During This Encounter   Medication Reason    metoprolol succinate (TOPROL-XL) 25 mg XL tablet        Orders Placed This Encounter    metoprolol succinate (TOPROL-XL) 25 mg XL tablet     Sig: Take 0.5 Tablets by mouth daily. Dispense:  90 Tablet     Refill:  3    amLODIPine (NORVASC) 2.5 mg tablet     Sig: Take 1 Tablet by mouth daily. Dispense:  90 Tablet     Refill:  3       Follow-up and Dispositions    · Return in about 3 months (around 2/16/2022).

## 2021-11-16 NOTE — PROGRESS NOTES
1. Have you been to the ER, urgent care clinic since your last visit? Hospitalized since your last visit? No    2. Have you seen or consulted any other health care providers outside of the [address] [place] [address] since your last visit? Include any pap smears or colon screening.       Beverly Rodriguez

## 2022-03-03 ENCOUNTER — OFFICE VISIT (OUTPATIENT)
Dept: CARDIOLOGY CLINIC | Age: 67
End: 2022-03-03
Payer: MEDICARE

## 2022-03-03 VITALS
HEIGHT: 66 IN | SYSTOLIC BLOOD PRESSURE: 123 MMHG | WEIGHT: 172 LBS | DIASTOLIC BLOOD PRESSURE: 63 MMHG | OXYGEN SATURATION: 97 % | HEART RATE: 66 BPM | BODY MASS INDEX: 27.64 KG/M2

## 2022-03-03 DIAGNOSIS — R00.2 PALPITATIONS: ICD-10-CM

## 2022-03-03 DIAGNOSIS — I49.3 PVC (PREMATURE VENTRICULAR CONTRACTION): ICD-10-CM

## 2022-03-03 DIAGNOSIS — I10 ESSENTIAL HYPERTENSION: ICD-10-CM

## 2022-03-03 DIAGNOSIS — R55 VASOVAGAL SYNCOPE: Primary | ICD-10-CM

## 2022-03-03 PROCEDURE — G8752 SYS BP LESS 140: HCPCS | Performed by: INTERNAL MEDICINE

## 2022-03-03 PROCEDURE — G8427 DOCREV CUR MEDS BY ELIG CLIN: HCPCS | Performed by: INTERNAL MEDICINE

## 2022-03-03 PROCEDURE — G8432 DEP SCR NOT DOC, RNG: HCPCS | Performed by: INTERNAL MEDICINE

## 2022-03-03 PROCEDURE — G8400 PT W/DXA NO RESULTS DOC: HCPCS | Performed by: INTERNAL MEDICINE

## 2022-03-03 PROCEDURE — 1101F PT FALLS ASSESS-DOCD LE1/YR: CPT | Performed by: INTERNAL MEDICINE

## 2022-03-03 PROCEDURE — 3017F COLORECTAL CA SCREEN DOC REV: CPT | Performed by: INTERNAL MEDICINE

## 2022-03-03 PROCEDURE — 99214 OFFICE O/P EST MOD 30 MIN: CPT | Performed by: INTERNAL MEDICINE

## 2022-03-03 PROCEDURE — G8754 DIAS BP LESS 90: HCPCS | Performed by: INTERNAL MEDICINE

## 2022-03-03 PROCEDURE — G8419 CALC BMI OUT NRM PARAM NOF/U: HCPCS | Performed by: INTERNAL MEDICINE

## 2022-03-03 PROCEDURE — G9899 SCRN MAM PERF RSLTS DOC: HCPCS | Performed by: INTERNAL MEDICINE

## 2022-03-03 PROCEDURE — G8536 NO DOC ELDER MAL SCRN: HCPCS | Performed by: INTERNAL MEDICINE

## 2022-03-03 PROCEDURE — 1090F PRES/ABSN URINE INCON ASSESS: CPT | Performed by: INTERNAL MEDICINE

## 2022-03-03 NOTE — PROGRESS NOTES
1. Have you been to the ER, urgent care clinic since your last visit? Hospitalized since your last visit?     no  2. Have you seen or consulted any other health care providers outside of the [address] [place] [address] since your last visit? Include any pap smears or colon screening. Yes Where: dentist     3. Since your last visit, have you had any of the following symptoms? shortness of breath and dizziness.

## 2022-03-03 NOTE — PROGRESS NOTES
HISTORY OF PRESENT ILLNESS  Klapana Rankin is a 77 y.o. female. 7/2019  Recent increasing chest pressure, tightness, shortness of breath. Also has felt lightheaded. Labile blood pressure and heart rate at home. Recent increase in migraine headaches. Chest pressure on exertion after doing things. Improved with rest.  Denies radiation. Follow-up  Associated symptoms include chest pain and shortness of breath. Pertinent negatives include no abdominal pain and no headaches. Dizziness  The history is provided by the patient. This is a new problem. The problem occurs rarely. The problem has been rapidly improving. Associated symptoms include chest pain and shortness of breath. Pertinent negatives include no abdominal pain and no headaches. The symptoms are aggravated by exertion. Nothing relieves the symptoms. Shortness of Breath  The history is provided by the patient. This is a recurrent problem. The problem occurs intermittently. The problem has been rapidly improving. Associated symptoms include chest pain. Pertinent negatives include no fever, no headaches, no cough, no sputum production, no hemoptysis, no wheezing, no PND, no orthopnea, no vomiting, no abdominal pain, no rash, no leg swelling and no claudication. The problem's precipitants include exercise. Palpitations   The history is provided by the patient. This is a new problem. The problem has been gradually worsening. The problem occurs every several days. The problem is associated with nothing. Associated symptoms include chest pain, dizziness and shortness of breath. Pertinent negatives include no fever, no claudication, no orthopnea, no PND, no abdominal pain, no nausea, no vomiting, no headaches, no weakness, no cough, no hemoptysis and no sputum production. Chest Pain (Angina)   The history is provided by the patient. This is a new problem. The current episode started more than 1 week ago. The problem has been gradually worsening.  The problem occurs every several days. The pain is associated with exertion. The pain is present in the substernal region. The pain is mild. The quality of the pain is described as pressure-like. The pain does not radiate. Associated symptoms include dizziness, palpitations and shortness of breath. Pertinent negatives include no abdominal pain, no claudication, no cough, no fever, no headaches, no hemoptysis, no nausea, no orthopnea, no PND, no sputum production, no vomiting and no weakness. Review of Systems   Constitutional: Negative for chills and fever. HENT: Negative for nosebleeds. Eyes: Negative for blurred vision and double vision. Respiratory: Positive for shortness of breath. Negative for cough, hemoptysis, sputum production and wheezing. Cardiovascular: Positive for chest pain and palpitations. Negative for orthopnea, claudication, leg swelling and PND. Gastrointestinal: Negative for abdominal pain, heartburn, nausea and vomiting. Musculoskeletal: Negative for myalgias. Skin: Negative for rash. Neurological: Positive for dizziness. Negative for weakness and headaches. Endo/Heme/Allergies: Does not bruise/bleed easily. Family History   Problem Relation Age of Onset    Heart Disease Father         a fib ablation/cva    Heart Attack Father     Stroke Father     Cancer Maternal Grandfather 61        colon    Breast Cancer Mother        Past Medical History:   Diagnosis Date    Essential hypertension     IBS (irritable bowel syndrome)     Valvular heart disease     mvp       Past Surgical History:   Procedure Laterality Date    HX CATARACT REMOVAL      HX HYSTERECTOMY  2004    HX OOPHORECTOMY  2004    HX SHOULDER ARTHROSCOPY         Allergies   Allergen Reactions    Epinephrine Shortness of Breath       Current Outpatient Medications   Medication Sig    TURMERIC ROOT EXTRACT PO Take  by mouth.     Cetirizine (ZyrTEC) 10 mg cap Zyrtec    fluticasone propionate (Flonase Allergy Relief) 50 mcg/actuation nasal spray     calcium-vitamin D3-vitamin K 650 mg-12.5 mcg-40 mcg chew     Xiidra 5 % dpet INSTILL 1 DROP IN BOTH EYES TWICE DAILY    Lotemax SM 0.38 % drpg INSTILL 1 DROP IN BOTH EYES TWICE DAILY    olive leaf extract 250 mg cap     glucosamine-chondroitin 500-400 mg tablet     dextromethorphan-guaiFENesin 5-100 mg/5 mL liqd Take  by mouth.  metoprolol succinate (TOPROL-XL) 25 mg XL tablet Take 0.5 Tablets by mouth daily.  amLODIPine (NORVASC) 2.5 mg tablet Take 1 Tablet by mouth daily.  famotidine/Ca carb/mag hydrox (PEPCID COMPLETE PO) Take  by mouth.  guaifenesin (MUCINEX PO) Take  by mouth.  TURMERIC PO Take  by mouth.  rizatriptan (MAXALT) 5 mg tablet Take 5 mg by mouth once as needed for Migraine. May repeat in 2 hours if needed    multivitamin (ONE A DAY) tablet Take 1 Tab by mouth daily.  magnesium 250 mg Tab Take  by mouth daily. No current facility-administered medications for this visit. Visit Vitals  /63   Pulse 66   Ht 5' 6\" (1.676 m)   Wt 78 kg (172 lb)   SpO2 97%   BMI 27.76 kg/m²           Physical Exam  Constitutional:       Appearance: She is well-developed. HENT:      Head: Normocephalic and atraumatic. Eyes:      Conjunctiva/sclera: Conjunctivae normal.   Neck:      Thyroid: No thyromegaly. Vascular: No JVD. Trachea: No tracheal deviation. Cardiovascular:      Rate and Rhythm: Normal rate and regular rhythm. Chest Wall: PMI is not displaced. Pulses: No decreased pulses. Heart sounds: No murmur heard. No gallop. No S3 sounds. Pulmonary:      Effort: No respiratory distress. Breath sounds: No wheezing or rales. Chest:      Chest wall: No tenderness. Abdominal:      Palpations: Abdomen is soft. Tenderness: There is no abdominal tenderness. Musculoskeletal:      Cervical back: Neck supple. Skin:     General: Skin is warm.    Neurological:      Mental Status: She is alert and oriented to person, place, and time. No flowsheet data found. 7/2017-stress echo  Impressions: Normal study after maximal exercise. Echo impressions: There was no diagnostic evidence for stress-induced  ischemia. SUMMARY:echo-7/2017  Left ventricle: Systolic function was normal. Ejection fraction was  estimated in the range of 55 % to 60 %. There were no regional wall motion  abnormalities. Doppler parameters were consistent with abnormal left  ventricular relaxation (grade 1 diastolic dysfunction). Right ventricle: The ventricle was mildly dilated. Mitral valve: There was mild annular calcification. There was mild  regurgitation. Tricuspid valve: There was mild regurgitation. Pulmonic valve: There was mild regurgitation. Teresa Jack MD   Cardiology 7/2017  Pre-procedure Diagnoses:   1. Dizziness [R42]   Post-procedure Diagnoses:   1. Vasovagal near syncope [R55]   Procedures:   1. TILT TABLE EVALUATION [[id number]]      []Hide copied text  []Hover for attribution information  Tilt table performed  Patient was in upright position for about 5 min after hydration  Her bp quickly dropped from 310'A systolic to 52'B systolic,patient was cold clammy -put in supine position with resolution of symptoms  No significant change in heart rate  Imp  Positive tilt table test  Vasovagal-vaso depressive responce        Interpretation Summary stress echo 9/2019    · Baseline ECG: Normal sinus rhythm. Poor R wave progression. Cannot rule out anterior infarct, age undetermined. · Negative stress test.  · Normal stress echocardiogram. Low risk study. 8/2019  Event monitor  Sinus rhythm, sinus bradycardia into 40s. PVC. No significant arrhythmia        Assessment       ICD-10-CM ICD-9-CM    1. Vasovagal syncope  R55 780.2     Stable symptoms continue treatment monitor   2. PVC (premature ventricular contraction)  I49.3 427.69     Stable monitor   3.  Essential hypertension  I10 401.9     Stable continue treatment   4. Palpitations  R00.2 785.1     Symptoms are improving we will continue to monitor also ENT evaluation is pending for tinnitus     On low dose beta blockers for vasovagal syncope  If needed add florinef    2/2018  symptoms controlled with beta blockers    3/2019  Cardiac status stable continue beta-blocker  7/2019  Recent worsening symptom of palpitation chest tightness dizziness. Will set up for further testing  9/2019  Cardiac status stable. Palpitations slowly improving continue current therapy, negative stress echo. Rare PVC on event monitor  4/2021  Cardiac status stable continue current medical management. No recurrent syncope. Chest pressure and palpitations stable. Occasional dizziness continue with pressure maneuvers and hydration. Beta-blocker as planned  11/2021  Recent increasing palpitation and lightheadedness. Will reduce beta-blocker . Had bradycardia in 30s and 40s. Add amlodipine low-dose for blood pressure. If symptoms persist after adjustment of medication consider repeat event monitor  3/2022  Cardiac status stable continue current medical management monitor symptoms are improving. Heart rate now in between         Medications Discontinued During This Encounter   Medication Reason    metoprolol succinate (Toprol XL) 25 mg XL tablet Not A Current Medication    aspirin 81 mg chewable tablet Not A Current Medication    multivitamins-minerals-lutein (MEN'S CENTRUM SILVER WITH LUTEIN) tab tablet Not A Current Medication    cetirizine (ZYRTEC) 10 mg tablet Not A Current Medication       No orders of the defined types were placed in this encounter. Follow-up and Dispositions    · Return in about 6 months (around 9/3/2022).

## 2022-03-18 PROBLEM — R55 VASOVAGAL SYNCOPE: Status: ACTIVE | Noted: 2018-02-21

## 2022-03-19 PROBLEM — R42 DIZZINESS: Status: ACTIVE | Noted: 2017-07-10

## 2022-03-19 PROBLEM — R07.89 CHEST PRESSURE: Status: ACTIVE | Noted: 2017-07-10

## 2022-03-20 PROBLEM — I10 ESSENTIAL HYPERTENSION: Status: ACTIVE | Noted: 2017-07-10

## 2022-05-12 ENCOUNTER — TRANSCRIBE ORDER (OUTPATIENT)
Dept: SCHEDULING | Age: 67
End: 2022-05-12

## 2022-05-12 DIAGNOSIS — Z12.31 VISIT FOR SCREENING MAMMOGRAM: Primary | ICD-10-CM

## 2022-05-13 RX ORDER — METOPROLOL SUCCINATE 25 MG/1
TABLET, EXTENDED RELEASE ORAL
Qty: 90 TABLET | Refills: 3 | Status: SHIPPED | OUTPATIENT
Start: 2022-05-13 | End: 2022-05-31

## 2022-05-31 RX ORDER — METOPROLOL SUCCINATE 25 MG/1
TABLET, EXTENDED RELEASE ORAL
Qty: 90 TABLET | Refills: 3 | OUTPATIENT
Start: 2022-05-31

## 2022-05-31 RX ORDER — METOPROLOL SUCCINATE 25 MG/1
25 TABLET, EXTENDED RELEASE ORAL DAILY
Qty: 90 TABLET | Refills: 2 | Status: SHIPPED | OUTPATIENT
Start: 2022-05-31

## 2022-06-03 ENCOUNTER — HOSPITAL ENCOUNTER (OUTPATIENT)
Dept: MAMMOGRAPHY | Age: 67
Discharge: HOME | End: 2022-06-03
Attending: FAMILY MEDICINE
Payer: MEDICARE

## 2022-06-03 DIAGNOSIS — Z12.31 VISIT FOR SCREENING MAMMOGRAM: ICD-10-CM

## 2022-06-03 PROCEDURE — 77063 BREAST TOMOSYNTHESIS BI: CPT

## 2022-09-15 ENCOUNTER — OFFICE VISIT (OUTPATIENT)
Dept: CARDIOLOGY CLINIC | Age: 67
End: 2022-09-15
Payer: MEDICARE

## 2022-09-15 VITALS
WEIGHT: 173 LBS | SYSTOLIC BLOOD PRESSURE: 134 MMHG | DIASTOLIC BLOOD PRESSURE: 75 MMHG | HEIGHT: 66 IN | HEART RATE: 68 BPM | BODY MASS INDEX: 27.8 KG/M2 | OXYGEN SATURATION: 97 %

## 2022-09-15 DIAGNOSIS — R00.2 PALPITATIONS: ICD-10-CM

## 2022-09-15 DIAGNOSIS — R55 VASOVAGAL SYNCOPE: Primary | ICD-10-CM

## 2022-09-15 DIAGNOSIS — I49.3 PVC (PREMATURE VENTRICULAR CONTRACTION): ICD-10-CM

## 2022-09-15 DIAGNOSIS — I10 ESSENTIAL HYPERTENSION: ICD-10-CM

## 2022-09-15 PROCEDURE — G8417 CALC BMI ABV UP PARAM F/U: HCPCS | Performed by: INTERNAL MEDICINE

## 2022-09-15 PROCEDURE — 3017F COLORECTAL CA SCREEN DOC REV: CPT | Performed by: INTERNAL MEDICINE

## 2022-09-15 PROCEDURE — G8400 PT W/DXA NO RESULTS DOC: HCPCS | Performed by: INTERNAL MEDICINE

## 2022-09-15 PROCEDURE — 1101F PT FALLS ASSESS-DOCD LE1/YR: CPT | Performed by: INTERNAL MEDICINE

## 2022-09-15 PROCEDURE — G8432 DEP SCR NOT DOC, RNG: HCPCS | Performed by: INTERNAL MEDICINE

## 2022-09-15 PROCEDURE — 1123F ACP DISCUSS/DSCN MKR DOCD: CPT | Performed by: INTERNAL MEDICINE

## 2022-09-15 PROCEDURE — G8427 DOCREV CUR MEDS BY ELIG CLIN: HCPCS | Performed by: INTERNAL MEDICINE

## 2022-09-15 PROCEDURE — 99214 OFFICE O/P EST MOD 30 MIN: CPT | Performed by: INTERNAL MEDICINE

## 2022-09-15 PROCEDURE — G9899 SCRN MAM PERF RSLTS DOC: HCPCS | Performed by: INTERNAL MEDICINE

## 2022-09-15 PROCEDURE — G8752 SYS BP LESS 140: HCPCS | Performed by: INTERNAL MEDICINE

## 2022-09-15 PROCEDURE — G8754 DIAS BP LESS 90: HCPCS | Performed by: INTERNAL MEDICINE

## 2022-09-15 PROCEDURE — G8536 NO DOC ELDER MAL SCRN: HCPCS | Performed by: INTERNAL MEDICINE

## 2022-09-15 PROCEDURE — 1090F PRES/ABSN URINE INCON ASSESS: CPT | Performed by: INTERNAL MEDICINE

## 2022-09-15 RX ORDER — LEVOCETIRIZINE DIHYDROCHLORIDE 5 MG/1
TABLET, FILM COATED ORAL
COMMUNITY

## 2022-09-15 NOTE — PROGRESS NOTES
HISTORY OF PRESENT ILLNESS  Teryl Bumpers is a 77 y.o. female. 7/2019  Recent increasing chest pressure, tightness, shortness of breath. Also has felt lightheaded. Labile blood pressure and heart rate at home. Recent increase in migraine headaches. Chest pressure on exertion after doing things. Improved with rest.  Denies radiation. Follow-up  Associated symptoms include chest pain and shortness of breath. Pertinent negatives include no abdominal pain and no headaches. Dizziness  The history is provided by the Patient. This is a new problem. The problem occurs rarely. The problem has been rapidly improving. Associated symptoms include chest pain and shortness of breath. Pertinent negatives include no abdominal pain and no headaches. The symptoms are aggravated by exertion. Nothing relieves the symptoms. Shortness of Breath  The history is provided by the Patient. This is a recurrent problem. The problem occurs intermittently. The problem has been rapidly improving. Associated symptoms include chest pain. Pertinent negatives include no fever, no headaches, no cough, no sputum production, no hemoptysis, no wheezing, no PND, no orthopnea, no vomiting, no abdominal pain, no rash, no leg swelling and no claudication. The problem's precipitants include exercise. Palpitations   The history is provided by the Patient. This is a recurrent problem. The problem has been gradually worsening. The problem occurs every several days. The problem is associated with nothing. Associated symptoms include chest pain, dizziness and shortness of breath. Pertinent negatives include no fever, no claudication, no orthopnea, no PND, no abdominal pain, no nausea, no vomiting, no headaches, no weakness, no cough, no hemoptysis and no sputum production. Chest Pain (Angina)   The history is provided by the Patient. This is a new problem. The current episode started more than 1 week ago. The problem has been gradually worsening. The problem occurs every several days. The pain is associated with exertion. The pain is present in the substernal region. The pain is mild. The quality of the pain is described as pressure-like. The pain does not radiate. Associated symptoms include dizziness, palpitations and shortness of breath. Pertinent negatives include no abdominal pain, no claudication, no cough, no fever, no headaches, no hemoptysis, no nausea, no orthopnea, no PND, no sputum production, no vomiting and no weakness. Review of Systems   Constitutional:  Negative for chills and fever. HENT:  Negative for nosebleeds. Eyes:  Negative for blurred vision and double vision. Respiratory:  Positive for shortness of breath. Negative for cough, hemoptysis, sputum production and wheezing. Cardiovascular:  Positive for chest pain and palpitations. Negative for orthopnea, claudication, leg swelling and PND. Gastrointestinal:  Negative for abdominal pain, heartburn, nausea and vomiting. Musculoskeletal:  Negative for myalgias. Skin:  Negative for rash. Neurological:  Positive for dizziness. Negative for weakness and headaches. Endo/Heme/Allergies:  Does not bruise/bleed easily.    Family History   Problem Relation Age of Onset    Heart Disease Father         a fib ablation/cva    Heart Attack Father     Stroke Father     Cancer Maternal Grandfather 61        colon    Breast Cancer Mother        Past Medical History:   Diagnosis Date    Essential hypertension     IBS (irritable bowel syndrome)     Valvular heart disease     mvp       Past Surgical History:   Procedure Laterality Date    HX CATARACT REMOVAL      HX HYSTERECTOMY  2004    HX OOPHORECTOMY  2004    HX SHOULDER ARTHROSCOPY         Allergies   Allergen Reactions    Epinephrine Shortness of Breath       Current Outpatient Medications   Medication Sig    levocetirizine (Xyzal) 5 mg tablet Take  by mouth.    metoprolol succinate (TOPROL-XL) 25 mg XL tablet Take 1 Tablet by mouth daily. fluticasone propionate (FLONASE) 50 mcg/actuation nasal spray     calcium-vitamin D3-vitamin K 650 mg-12.5 mcg-40 mcg chew     Xiidra 5 % dpet INSTILL 1 DROP IN BOTH EYES TWICE DAILY    glucosamine-chondroitin 500-400 mg tablet     amLODIPine (NORVASC) 2.5 mg tablet Take 1 Tablet by mouth daily. famotidine/Ca carb/mag hydrox (PEPCID COMPLETE PO) Take  by mouth.    guaifenesin (MUCINEX PO) Take  by mouth. TURMERIC PO Take  by mouth.    rizatriptan (MAXALT) 5 mg tablet Take 5 mg by mouth once as needed for Migraine. May repeat in 2 hours if needed    multivitamin (ONE A DAY) tablet Take 1 Tab by mouth daily. Cetirizine (ZyrTEC) 10 mg cap Zyrtec    Lotemax SM 0.38 % drpg INSTILL 1 DROP IN BOTH EYES TWICE DAILY    olive leaf extract 250 mg cap  (Patient not taking: Reported on 9/15/2022)    dextromethorphan-guaiFENesin 5-100 mg/5 mL liqd Take  by mouth. (Patient not taking: Reported on 9/15/2022)    magnesium 250 mg Tab Take  by mouth daily. (Patient not taking: Reported on 9/15/2022)     No current facility-administered medications for this visit. Visit Vitals  /75   Pulse 68   Ht 5' 6\" (1.676 m)   Wt 78.5 kg (173 lb)   SpO2 97%   BMI 27.92 kg/m²           Physical Exam  Constitutional:       Appearance: She is well-developed. HENT:      Head: Normocephalic and atraumatic. Eyes:      Conjunctiva/sclera: Conjunctivae normal.   Neck:      Thyroid: No thyromegaly. Vascular: No JVD. Trachea: No tracheal deviation. Cardiovascular:      Rate and Rhythm: Normal rate and regular rhythm. Chest Wall: PMI is not displaced. Pulses: No decreased pulses. Heart sounds: No murmur heard. No gallop. No S3 sounds. Pulmonary:      Effort: No respiratory distress. Breath sounds: No wheezing or rales. Chest:      Chest wall: No tenderness. Abdominal:      Palpations: Abdomen is soft. Tenderness: There is no abdominal tenderness.    Musculoskeletal: Cervical back: Neck supple. Skin:     General: Skin is warm. Neurological:      Mental Status: She is alert and oriented to person, place, and time. No flowsheet data found. 7/2017-stress echo  Impressions: Normal study after maximal exercise. Echo impressions: There was no diagnostic evidence for stress-induced  ischemia. SUMMARY:echo-7/2017  Left ventricle: Systolic function was normal. Ejection fraction was  estimated in the range of 55 % to 60 %. There were no regional wall motion  abnormalities. Doppler parameters were consistent with abnormal left  ventricular relaxation (grade 1 diastolic dysfunction). Right ventricle: The ventricle was mildly dilated. Mitral valve: There was mild annular calcification. There was mild  regurgitation. Tricuspid valve: There was mild regurgitation. Pulmonic valve: There was mild regurgitation. Dede Renae MD   Cardiology 7/2017  Pre-procedure Diagnoses:   1. Dizziness [R42]   Post-procedure Diagnoses:   1. Vasovagal near syncope [R55]   Procedures:   1. TILT TABLE EVALUATION [[id number]]      []Hide copied text  []Hover for attribution information  Tilt table performed  Patient was in upright position for about 5 min after hydration  Her bp quickly dropped from 731'P systolic to 77'D systolic,patient was cold clammy -put in supine position with resolution of symptoms  No significant change in heart rate  Imp  Positive tilt table test  Vasovagal-vaso depressive responce        Interpretation Summary stress echo 9/2019    Baseline ECG: Normal sinus rhythm. Poor R wave progression. Cannot rule out anterior infarct, age undetermined. Negative stress test.  Normal stress echocardiogram. Low risk study. 8/2019  Event monitor  Sinus rhythm, sinus bradycardia into 40s. PVC. No significant arrhythmia        Assessment       ICD-10-CM ICD-9-CM    1.  Vasovagal syncope  R55 780.2     Intermittent episode of lightheadedness and continue monitoring with position change hydration      2. PVC (premature ventricular contraction)  I49.3 427.69     Symptoms stable continue treatment      3. Essential hypertension  I10 401.9     Stable continue therapy      4. Palpitations  R00.2 785.1     Stable monitor        On low dose beta blockers for vasovagal syncope  If needed add florinef    2/2018  symptoms controlled with beta blockers    3/2019  Cardiac status stable continue beta-blocker  7/2019  Recent worsening symptom of palpitation chest tightness dizziness. Will set up for further testing  9/2019  Cardiac status stable. Palpitations slowly improving continue current therapy, negative stress echo. Rare PVC on event monitor  4/2021  Cardiac status stable continue current medical management. No recurrent syncope. Chest pressure and palpitations stable. Occasional dizziness continue with pressure maneuvers and hydration. Beta-blocker as planned  11/2021  Recent increasing palpitation and lightheadedness. Will reduce beta-blocker . Had bradycardia in 30s and 40s. Add amlodipine low-dose for blood pressure. If symptoms persist after adjustment of medication consider repeat event monitor  3/2022  Cardiac status stable continue current medical management monitor symptoms are improving. Heart rate now in between   9/2022  Stable cardiac status. Pressure controlled. Intermittent dizziness. Continue monitoring continue with hydration. Medications Discontinued During This Encounter   Medication Reason    TURMERIC ROOT EXTRACT PO DUPLICATE ORDER       No orders of the defined types were placed in this encounter. Follow-up and Dispositions    Return in about 6 months (around 3/15/2023).

## 2022-09-15 NOTE — PROGRESS NOTES
1. Have you been to the ER, urgent care clinic since your last visit? Hospitalized since your last visit? No    2. Have you seen or consulted any other health care providers outside of the [address] [place] [address] since your last visit? Include any pap smears or colon screening.       Yes Where: Dr. Cristina Cotto, Dr. Jaylen Garcia, Dr. Kaley Haynes Reason for visit: PCP, ENT, [facility]

## 2023-01-31 RX ORDER — AMLODIPINE BESYLATE 2.5 MG/1
TABLET ORAL
Qty: 90 TABLET | Refills: 3 | Status: SHIPPED | OUTPATIENT
Start: 2023-01-31

## 2023-03-29 ENCOUNTER — OFFICE VISIT (OUTPATIENT)
Age: 68
End: 2023-03-29
Payer: MEDICARE

## 2023-03-29 VITALS
HEART RATE: 67 BPM | WEIGHT: 175 LBS | BODY MASS INDEX: 28.12 KG/M2 | SYSTOLIC BLOOD PRESSURE: 125 MMHG | OXYGEN SATURATION: 98 % | DIASTOLIC BLOOD PRESSURE: 67 MMHG | HEIGHT: 66 IN

## 2023-03-29 DIAGNOSIS — I10 ESSENTIAL (PRIMARY) HYPERTENSION: ICD-10-CM

## 2023-03-29 DIAGNOSIS — R00.2 PALPITATIONS: ICD-10-CM

## 2023-03-29 DIAGNOSIS — R55 SYNCOPE AND COLLAPSE: Primary | ICD-10-CM

## 2023-03-29 DIAGNOSIS — I49.3 VENTRICULAR PREMATURE DEPOLARIZATION: ICD-10-CM

## 2023-03-29 PROCEDURE — G8400 PT W/DXA NO RESULTS DOC: HCPCS | Performed by: INTERNAL MEDICINE

## 2023-03-29 PROCEDURE — 1123F ACP DISCUSS/DSCN MKR DOCD: CPT | Performed by: INTERNAL MEDICINE

## 2023-03-29 PROCEDURE — 3078F DIAST BP <80 MM HG: CPT | Performed by: INTERNAL MEDICINE

## 2023-03-29 PROCEDURE — G8419 CALC BMI OUT NRM PARAM NOF/U: HCPCS | Performed by: INTERNAL MEDICINE

## 2023-03-29 PROCEDURE — G8427 DOCREV CUR MEDS BY ELIG CLIN: HCPCS | Performed by: INTERNAL MEDICINE

## 2023-03-29 PROCEDURE — 1090F PRES/ABSN URINE INCON ASSESS: CPT | Performed by: INTERNAL MEDICINE

## 2023-03-29 PROCEDURE — 99214 OFFICE O/P EST MOD 30 MIN: CPT | Performed by: INTERNAL MEDICINE

## 2023-03-29 PROCEDURE — G8484 FLU IMMUNIZE NO ADMIN: HCPCS | Performed by: INTERNAL MEDICINE

## 2023-03-29 PROCEDURE — 3074F SYST BP LT 130 MM HG: CPT | Performed by: INTERNAL MEDICINE

## 2023-03-29 PROCEDURE — 3017F COLORECTAL CA SCREEN DOC REV: CPT | Performed by: INTERNAL MEDICINE

## 2023-03-29 PROCEDURE — 1036F TOBACCO NON-USER: CPT | Performed by: INTERNAL MEDICINE

## 2023-03-29 RX ORDER — CYCLOSPORINE 0 G/ML
SOLUTION/ DROPS OPHTHALMIC; TOPICAL
COMMUNITY

## 2023-03-29 NOTE — PROGRESS NOTES
1. Have you been to the ER, urgent care clinic since your last visit? Hospitalized since your last visit? No    2. Have you seen or consulted any other health care providers outside of the [address] [place] [address] since your last visit? Include any pap smears or colon screening. No     3. Do you need any refills today?    no
current medical management monitor

## 2023-05-01 RX ORDER — METOPROLOL SUCCINATE 25 MG/1
TABLET, EXTENDED RELEASE ORAL
Qty: 90 TABLET | Refills: 3 | Status: SHIPPED | OUTPATIENT
Start: 2023-05-01

## 2023-05-12 ENCOUNTER — TRANSCRIBE ORDERS (OUTPATIENT)
Dept: SCHEDULING | Age: 68
End: 2023-05-12

## 2023-05-12 DIAGNOSIS — Z12.31 VISIT FOR SCREENING MAMMOGRAM: Primary | ICD-10-CM

## 2023-06-06 ENCOUNTER — HOSPITAL ENCOUNTER (OUTPATIENT)
Facility: HOSPITAL | Age: 68
Discharge: HOME | End: 2023-06-09
Payer: MEDICARE

## 2023-06-06 DIAGNOSIS — Z12.31 VISIT FOR SCREENING MAMMOGRAM: ICD-10-CM

## 2023-06-06 PROCEDURE — 77063 BREAST TOMOSYNTHESIS BI: CPT

## 2023-09-12 ENCOUNTER — OFFICE VISIT (OUTPATIENT)
Age: 68
End: 2023-09-12
Payer: MEDICARE

## 2023-09-12 VITALS
DIASTOLIC BLOOD PRESSURE: 82 MMHG | SYSTOLIC BLOOD PRESSURE: 137 MMHG | WEIGHT: 173 LBS | HEIGHT: 66 IN | HEART RATE: 73 BPM | OXYGEN SATURATION: 94 % | BODY MASS INDEX: 27.8 KG/M2

## 2023-09-12 DIAGNOSIS — I49.3 VENTRICULAR PREMATURE DEPOLARIZATION: ICD-10-CM

## 2023-09-12 DIAGNOSIS — R00.2 PALPITATIONS: ICD-10-CM

## 2023-09-12 DIAGNOSIS — I10 ESSENTIAL (PRIMARY) HYPERTENSION: ICD-10-CM

## 2023-09-12 DIAGNOSIS — R55 SYNCOPE AND COLLAPSE: Primary | ICD-10-CM

## 2023-09-12 PROCEDURE — 3017F COLORECTAL CA SCREEN DOC REV: CPT | Performed by: INTERNAL MEDICINE

## 2023-09-12 PROCEDURE — 3079F DIAST BP 80-89 MM HG: CPT | Performed by: INTERNAL MEDICINE

## 2023-09-12 PROCEDURE — 1036F TOBACCO NON-USER: CPT | Performed by: INTERNAL MEDICINE

## 2023-09-12 PROCEDURE — G8400 PT W/DXA NO RESULTS DOC: HCPCS | Performed by: INTERNAL MEDICINE

## 2023-09-12 PROCEDURE — 1123F ACP DISCUSS/DSCN MKR DOCD: CPT | Performed by: INTERNAL MEDICINE

## 2023-09-12 PROCEDURE — 1090F PRES/ABSN URINE INCON ASSESS: CPT | Performed by: INTERNAL MEDICINE

## 2023-09-12 PROCEDURE — 3075F SYST BP GE 130 - 139MM HG: CPT | Performed by: INTERNAL MEDICINE

## 2023-09-12 PROCEDURE — 99214 OFFICE O/P EST MOD 30 MIN: CPT | Performed by: INTERNAL MEDICINE

## 2023-09-12 PROCEDURE — G8419 CALC BMI OUT NRM PARAM NOF/U: HCPCS | Performed by: INTERNAL MEDICINE

## 2023-09-12 PROCEDURE — G8428 CUR MEDS NOT DOCUMENT: HCPCS | Performed by: INTERNAL MEDICINE

## 2023-09-12 NOTE — PROGRESS NOTES
1. Have you been to the ER, urgent care clinic since your last visit? Hospitalized since your last visit?     no      2. Where do you normally have your labs drawn? 3. Do you need any refills today?    no
pressure stable

## 2023-11-13 RX ORDER — AMLODIPINE BESYLATE 2.5 MG/1
2.5 TABLET ORAL DAILY
Qty: 90 TABLET | Refills: 0 | Status: SHIPPED | OUTPATIENT
Start: 2023-11-13

## 2024-01-15 RX ORDER — AMLODIPINE BESYLATE 2.5 MG/1
2.5 TABLET ORAL DAILY
Qty: 90 TABLET | Refills: 3 | Status: SHIPPED | OUTPATIENT
Start: 2024-01-15

## 2024-01-15 RX ORDER — METOPROLOL SUCCINATE 25 MG/1
25 TABLET, EXTENDED RELEASE ORAL DAILY
Qty: 90 TABLET | Refills: 3 | Status: SHIPPED | OUTPATIENT
Start: 2024-01-15

## 2024-01-23 ENCOUNTER — HOSPITAL ENCOUNTER (OUTPATIENT)
Facility: HOSPITAL | Age: 69
Discharge: HOME | End: 2024-01-26
Payer: MEDICARE

## 2024-01-23 DIAGNOSIS — H90.3 SENSORY HEARING LOSS, BILATERAL: ICD-10-CM

## 2024-01-23 LAB — CREAT UR-MCNC: 0.9 MG/DL (ref 0.6–1.3)

## 2024-01-23 PROCEDURE — 82565 ASSAY OF CREATININE: CPT

## 2024-01-23 PROCEDURE — 6360000004 HC RX CONTRAST MEDICATION: Performed by: PHYSICIAN ASSISTANT

## 2024-01-23 PROCEDURE — A9577 INJ MULTIHANCE: HCPCS | Performed by: PHYSICIAN ASSISTANT

## 2024-01-23 PROCEDURE — 70553 MRI BRAIN STEM W/O & W/DYE: CPT

## 2024-01-23 RX ADMIN — GADOBENATE DIMEGLUMINE 17 ML: 529 INJECTION, SOLUTION INTRAVENOUS at 09:43

## 2024-04-22 ENCOUNTER — OFFICE VISIT (OUTPATIENT)
Age: 69
End: 2024-04-22
Payer: MEDICARE

## 2024-04-22 VITALS
HEIGHT: 66 IN | SYSTOLIC BLOOD PRESSURE: 129 MMHG | BODY MASS INDEX: 27.64 KG/M2 | HEART RATE: 72 BPM | WEIGHT: 172 LBS | DIASTOLIC BLOOD PRESSURE: 65 MMHG | OXYGEN SATURATION: 97 %

## 2024-04-22 DIAGNOSIS — I10 ESSENTIAL HYPERTENSION: ICD-10-CM

## 2024-04-22 DIAGNOSIS — R00.2 PALPITATIONS: Primary | ICD-10-CM

## 2024-04-22 DIAGNOSIS — R55 VASOVAGAL SYNCOPE: ICD-10-CM

## 2024-04-22 DIAGNOSIS — I49.3 PVC (PREMATURE VENTRICULAR CONTRACTION): ICD-10-CM

## 2024-04-22 PROCEDURE — 3074F SYST BP LT 130 MM HG: CPT | Performed by: NURSE PRACTITIONER

## 2024-04-22 PROCEDURE — G8419 CALC BMI OUT NRM PARAM NOF/U: HCPCS | Performed by: NURSE PRACTITIONER

## 2024-04-22 PROCEDURE — 1123F ACP DISCUSS/DSCN MKR DOCD: CPT | Performed by: NURSE PRACTITIONER

## 2024-04-22 PROCEDURE — 93000 ELECTROCARDIOGRAM COMPLETE: CPT | Performed by: NURSE PRACTITIONER

## 2024-04-22 PROCEDURE — G8427 DOCREV CUR MEDS BY ELIG CLIN: HCPCS | Performed by: NURSE PRACTITIONER

## 2024-04-22 PROCEDURE — 3017F COLORECTAL CA SCREEN DOC REV: CPT | Performed by: NURSE PRACTITIONER

## 2024-04-22 PROCEDURE — 99214 OFFICE O/P EST MOD 30 MIN: CPT | Performed by: NURSE PRACTITIONER

## 2024-04-22 PROCEDURE — 1036F TOBACCO NON-USER: CPT | Performed by: NURSE PRACTITIONER

## 2024-04-22 PROCEDURE — G8400 PT W/DXA NO RESULTS DOC: HCPCS | Performed by: NURSE PRACTITIONER

## 2024-04-22 PROCEDURE — 3078F DIAST BP <80 MM HG: CPT | Performed by: NURSE PRACTITIONER

## 2024-04-22 PROCEDURE — 1090F PRES/ABSN URINE INCON ASSESS: CPT | Performed by: NURSE PRACTITIONER

## 2024-04-22 RX ORDER — AZELASTINE 1 MG/ML
2 SPRAY, METERED NASAL 2 TIMES DAILY
COMMUNITY
Start: 2023-10-11

## 2024-04-22 RX ORDER — RABEPRAZOLE SODIUM 20 MG/1
20 TABLET, DELAYED RELEASE ORAL DAILY
COMMUNITY

## 2024-04-22 RX ORDER — METOPROLOL SUCCINATE 25 MG/1
12.5 TABLET, EXTENDED RELEASE ORAL DAILY
Qty: 45 TABLET | Refills: 3 | Status: SHIPPED | OUTPATIENT
Start: 2024-04-22

## 2024-04-22 NOTE — PROGRESS NOTES
test   Vasovagal-vaso depressive responce                  Interpretation Summary stress echo 9/2019           Baseline ECG: Normal sinus rhythm.Poor R wave progression. Cannot rule out anterior infarct, age undetermined.    Negative stress test.    Normal stress echocardiogram. Low risk study.         8/2019   Event monitor   Sinus rhythm, sinus bradycardia into 40s.   PVC.  No significant arrhythmia               No data to display                  Assessment        Diagnosis Orders   1. Palpitations  EKG 12 Lead      2. Vasovagal syncope        3. Essential hypertension        4. PVC (premature ventricular contraction)              Medications Discontinued During This Encounter   Medication Reason    metoprolol succinate (TOPROL XL) 25 MG extended release tablet REORDER            On low dose beta blockers for vasovagal syncope   If needed add florinef      2/2018   symptoms controlled with beta blockers      3/2019   Cardiac status stable continue beta-blocker   7/2019   Recent worsening symptom of palpitation chest tightness dizziness.  Will set up for further testing   9/2019   Cardiac status stable.  Palpitations slowly improving continue current therapy, negative stress echo.  Rare PVC on event monitor   4/2021   Cardiac status stable continue current medical management.  No recurrent syncope.  Chest pressure and palpitations stable.  Occasional dizziness continue with pressure maneuvers and hydration.  Beta-blocker as planned   11/2021   Recent increasing palpitation and lightheadedness.  Will reduce beta-blocker .  Had bradycardia in 30s and 40s.  Add amlodipine low-dose for blood pressure.  If symptoms persist after adjustment of medication consider repeat event monitor   3/2022   Cardiac status stable continue current medical management monitor symptoms are improving.  Heart rate now in between    9/2022  Stable cardiac status.  Pressure controlled.  Intermittent dizziness.  Continue monitoring

## 2024-06-18 ENCOUNTER — HOSPITAL ENCOUNTER (OUTPATIENT)
Facility: HOSPITAL | Age: 69
Discharge: HOME | End: 2024-06-21
Payer: MEDICARE

## 2024-06-18 LAB
ALBUMIN SERPL-MCNC: 4 G/DL (ref 3.4–5)
ALBUMIN/GLOB SERPL: 1.2 (ref 0.8–1.7)
ALP SERPL-CCNC: 86 U/L (ref 45–117)
ALT SERPL-CCNC: 26 U/L (ref 13–56)
ANION GAP SERPL CALC-SCNC: 5 MMOL/L (ref 3–18)
AST SERPL-CCNC: 20 U/L (ref 10–38)
BILIRUB SERPL-MCNC: 0.7 MG/DL (ref 0.2–1)
BUN SERPL-MCNC: 22 MG/DL (ref 7–18)
BUN/CREAT SERPL: 19 (ref 12–20)
CALCIUM SERPL-MCNC: 9.4 MG/DL (ref 8.5–10.1)
CHLORIDE SERPL-SCNC: 105 MMOL/L (ref 100–111)
CO2 SERPL-SCNC: 28 MMOL/L (ref 21–32)
CREAT SERPL-MCNC: 1.13 MG/DL (ref 0.6–1.3)
GLOBULIN SER CALC-MCNC: 3.3 G/DL (ref 2–4)
GLUCOSE SERPL-MCNC: 93 MG/DL (ref 74–99)
POTASSIUM SERPL-SCNC: 4.8 MMOL/L (ref 3.5–5.5)
PROT SERPL-MCNC: 7.3 G/DL (ref 6.4–8.2)
SODIUM SERPL-SCNC: 138 MMOL/L (ref 136–145)

## 2024-06-18 PROCEDURE — 36415 COLL VENOUS BLD VENIPUNCTURE: CPT

## 2024-06-18 PROCEDURE — 80053 COMPREHEN METABOLIC PANEL: CPT

## 2024-07-08 ENCOUNTER — TRANSCRIBE ORDERS (OUTPATIENT)
Facility: HOSPITAL | Age: 69
End: 2024-07-08

## 2024-07-08 DIAGNOSIS — Z12.31 VISIT FOR SCREENING MAMMOGRAM: Primary | ICD-10-CM

## 2024-07-08 RX ORDER — AMLODIPINE BESYLATE 2.5 MG/1
2.5 TABLET ORAL DAILY
Qty: 90 TABLET | Refills: 3 | Status: SHIPPED | OUTPATIENT
Start: 2024-07-08

## 2024-07-25 ENCOUNTER — HOSPITAL ENCOUNTER (OUTPATIENT)
Facility: HOSPITAL | Age: 69
Discharge: HOME | End: 2024-07-25
Payer: MEDICARE

## 2024-07-25 VITALS — HEIGHT: 66 IN | BODY MASS INDEX: 27.64 KG/M2 | WEIGHT: 171.96 LBS

## 2024-07-25 DIAGNOSIS — Z12.31 VISIT FOR SCREENING MAMMOGRAM: ICD-10-CM

## 2024-07-25 PROCEDURE — 77063 BREAST TOMOSYNTHESIS BI: CPT

## 2024-08-16 ENCOUNTER — HOSPITAL ENCOUNTER (OUTPATIENT)
Facility: HOSPITAL | Age: 69
Discharge: HOME | End: 2024-08-19

## 2024-08-16 ENCOUNTER — HOSPITAL ENCOUNTER (OUTPATIENT)
Facility: HOSPITAL | Age: 69
End: 2024-08-16
Payer: MEDICARE

## 2024-08-16 DIAGNOSIS — R92.8 ABNORMAL MAMMOGRAM: ICD-10-CM

## 2024-08-16 PROCEDURE — G0279 TOMOSYNTHESIS, MAMMO: HCPCS

## 2024-09-18 RX ORDER — AMLODIPINE BESYLATE 2.5 MG/1
2.5 TABLET ORAL DAILY
Qty: 90 TABLET | Refills: 3 | Status: SHIPPED | OUTPATIENT
Start: 2024-09-18

## 2024-10-10 RX ORDER — METOPROLOL SUCCINATE 25 MG/1
12.5 TABLET, EXTENDED RELEASE ORAL DAILY
Qty: 45 TABLET | Refills: 3 | Status: SHIPPED | OUTPATIENT
Start: 2024-10-10

## 2025-01-13 ENCOUNTER — OFFICE VISIT (OUTPATIENT)
Age: 70
End: 2025-01-13
Payer: MEDICARE

## 2025-01-13 VITALS
BODY MASS INDEX: 25.77 KG/M2 | WEIGHT: 174 LBS | SYSTOLIC BLOOD PRESSURE: 136 MMHG | HEART RATE: 78 BPM | DIASTOLIC BLOOD PRESSURE: 71 MMHG | HEIGHT: 69 IN | OXYGEN SATURATION: 98 %

## 2025-01-13 DIAGNOSIS — I49.3 PVC (PREMATURE VENTRICULAR CONTRACTION): ICD-10-CM

## 2025-01-13 DIAGNOSIS — R55 SYNCOPE AND COLLAPSE: ICD-10-CM

## 2025-01-13 DIAGNOSIS — R00.2 PALPITATIONS: Primary | ICD-10-CM

## 2025-01-13 DIAGNOSIS — R55 VASOVAGAL SYNCOPE: ICD-10-CM

## 2025-01-13 DIAGNOSIS — I10 ESSENTIAL HYPERTENSION: ICD-10-CM

## 2025-01-13 DIAGNOSIS — I49.3 VENTRICULAR PREMATURE DEPOLARIZATION: ICD-10-CM

## 2025-01-13 PROCEDURE — 99213 OFFICE O/P EST LOW 20 MIN: CPT | Performed by: NURSE PRACTITIONER

## 2025-01-13 PROCEDURE — 1160F RVW MEDS BY RX/DR IN RCRD: CPT | Performed by: NURSE PRACTITIONER

## 2025-01-13 PROCEDURE — G8419 CALC BMI OUT NRM PARAM NOF/U: HCPCS | Performed by: NURSE PRACTITIONER

## 2025-01-13 PROCEDURE — 1036F TOBACCO NON-USER: CPT | Performed by: NURSE PRACTITIONER

## 2025-01-13 PROCEDURE — 1159F MED LIST DOCD IN RCRD: CPT | Performed by: NURSE PRACTITIONER

## 2025-01-13 PROCEDURE — G8400 PT W/DXA NO RESULTS DOC: HCPCS | Performed by: NURSE PRACTITIONER

## 2025-01-13 PROCEDURE — G8427 DOCREV CUR MEDS BY ELIG CLIN: HCPCS | Performed by: NURSE PRACTITIONER

## 2025-01-13 PROCEDURE — 1090F PRES/ABSN URINE INCON ASSESS: CPT | Performed by: NURSE PRACTITIONER

## 2025-01-13 PROCEDURE — 3078F DIAST BP <80 MM HG: CPT | Performed by: NURSE PRACTITIONER

## 2025-01-13 PROCEDURE — 3017F COLORECTAL CA SCREEN DOC REV: CPT | Performed by: NURSE PRACTITIONER

## 2025-01-13 PROCEDURE — 1123F ACP DISCUSS/DSCN MKR DOCD: CPT | Performed by: NURSE PRACTITIONER

## 2025-01-13 PROCEDURE — 3075F SYST BP GE 130 - 139MM HG: CPT | Performed by: NURSE PRACTITIONER

## 2025-01-13 NOTE — PROGRESS NOTES
Tilt table performed   Patient was in upright position for about 5 min after hydration   Her bp quickly dropped from 130's systolic to 60's systolic,patient was cold clammy -put in supine position with resolution of symptoms   No significant change in heart rate   Imp   Positive tilt table test   Vasovagal-vaso depressive responce                  Interpretation Summary stress echo 9/2019           Baseline ECG: Normal sinus rhythm.Poor R wave progression. Cannot rule out anterior infarct, age undetermined.    Negative stress test.    Normal stress echocardiogram. Low risk study.         8/2019   Event monitor   Sinus rhythm, sinus bradycardia into 40s.   PVC.  No significant arrhythmia     Cholesterol 181 -- -- -- 163 -- 180 Load older lab results   Triglyceride 126 -- -- -- 108 -- 91 Load older lab results   HDL 53 -- -- -- 56 -- 55 Load older lab results   Cholesterol/HDL 3.4 -- -- -- 2.9 -- 3.3 Load older lab results   Non-HDL Cholesterol 128 -- -- -- 107 -- 125 Load older lab results   LDL CALCULATION 102 High     -- -- -- 85 -- 107 High     Load older lab results   VLDL CALCULATION 25 -- -- -- 22 -- 18 Load older lab results   LDL/HDL Ratio 1.9 -- -- -- 1.5                  No data to display                  Assessment        Diagnosis Orders   1. Palpitations        2. Vasovagal syncope        3. Essential hypertension        4. PVC (premature ventricular contraction)        5. Ventricular premature depolarization        6. Syncope and collapse            There are no discontinued medications.        On low dose beta blockers for vasovagal syncope   If needed add florinef      2/2018   symptoms controlled with beta blockers      3/2019   Cardiac status stable continue beta-blocker   7/2019   Recent worsening symptom of palpitation chest tightness dizziness.  Will set up for further testing   9/2019   Cardiac status stable.  Palpitations slowly improving continue current therapy, negative stress echo.  Rare

## 2025-02-24 DIAGNOSIS — R00.2 PALPITATIONS: Primary | ICD-10-CM

## 2025-02-24 RX ORDER — METOPROLOL SUCCINATE 25 MG/1
12.5 TABLET, EXTENDED RELEASE ORAL DAILY
Qty: 90 TABLET | Refills: 3 | Status: SHIPPED | OUTPATIENT
Start: 2025-02-24

## 2025-08-15 RX ORDER — AMLODIPINE BESYLATE 2.5 MG/1
2.5 TABLET ORAL DAILY
Qty: 90 TABLET | Refills: 3 | Status: SHIPPED | OUTPATIENT
Start: 2025-08-15